# Patient Record
Sex: MALE | Race: BLACK OR AFRICAN AMERICAN | Employment: UNEMPLOYED | URBAN - METROPOLITAN AREA
[De-identification: names, ages, dates, MRNs, and addresses within clinical notes are randomized per-mention and may not be internally consistent; named-entity substitution may affect disease eponyms.]

---

## 2019-03-11 ENCOUNTER — APPOINTMENT (OUTPATIENT)
Dept: GENERAL RADIOLOGY | Age: 59
End: 2019-03-11
Attending: EMERGENCY MEDICINE
Payer: MEDICAID

## 2019-03-11 ENCOUNTER — HOSPITAL ENCOUNTER (EMERGENCY)
Age: 59
Discharge: HOME | End: 2019-03-11
Attending: EMERGENCY MEDICINE
Payer: MEDICAID

## 2019-03-11 VITALS
HEIGHT: 68 IN | SYSTOLIC BLOOD PRESSURE: 159 MMHG | TEMPERATURE: 98 F | HEART RATE: 90 BPM | OXYGEN SATURATION: 95 % | RESPIRATION RATE: 16 BRPM | WEIGHT: 162 LBS | DIASTOLIC BLOOD PRESSURE: 87 MMHG | BODY MASS INDEX: 24.55 KG/M2

## 2019-03-11 DIAGNOSIS — H10.212 CHEMICAL CONJUNCTIVITIS OF LEFT EYE: Primary | ICD-10-CM

## 2019-03-11 DIAGNOSIS — S16.1XXA STRAIN OF NECK MUSCLE, INITIAL ENCOUNTER: ICD-10-CM

## 2019-03-11 PROCEDURE — 74011000250 HC RX REV CODE- 250: Performed by: EMERGENCY MEDICINE

## 2019-03-11 PROCEDURE — 72040 X-RAY EXAM NECK SPINE 2-3 VW: CPT

## 2019-03-11 PROCEDURE — 74011250637 HC RX REV CODE- 250/637: Performed by: EMERGENCY MEDICINE

## 2019-03-11 PROCEDURE — 99285 EMERGENCY DEPT VISIT HI MDM: CPT

## 2019-03-11 RX ORDER — ERYTHROMYCIN 5 MG/G
OINTMENT OPHTHALMIC
Status: COMPLETED | OUTPATIENT
Start: 2019-03-11 | End: 2019-03-11

## 2019-03-11 RX ORDER — PROPARACAINE HYDROCHLORIDE 5 MG/ML
1 SOLUTION/ DROPS OPHTHALMIC
Status: COMPLETED | OUTPATIENT
Start: 2019-03-11 | End: 2019-03-11

## 2019-03-11 RX ORDER — ERYTHROMYCIN 5 MG/G
OINTMENT OPHTHALMIC
Qty: 3.5 G | Refills: 0 | Status: SHIPPED | OUTPATIENT
Start: 2019-03-11 | End: 2022-08-01 | Stop reason: ALTCHOICE

## 2019-03-11 RX ORDER — ACETAMINOPHEN 500 MG
1000 TABLET ORAL
Status: COMPLETED | OUTPATIENT
Start: 2019-03-11 | End: 2019-03-11

## 2019-03-11 RX ADMIN — FLUORESCEIN SODIUM 1 STRIP: 1 STRIP OPHTHALMIC at 06:06

## 2019-03-11 RX ADMIN — ERYTHROMYCIN: 5 OINTMENT OPHTHALMIC at 06:32

## 2019-03-11 RX ADMIN — ACETAMINOPHEN 1000 MG: 500 TABLET, FILM COATED ORAL at 06:32

## 2019-03-11 RX ADMIN — PROPARACAINE HYDROCHLORIDE 1 DROP: 5 SOLUTION/ DROPS OPHTHALMIC at 06:06

## 2019-03-11 NOTE — ED TRIAGE NOTES
Patient stating that he cannot open his eyes but then will spontaneously open his eyes with no difficulty.
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
## 2019-03-11 NOTE — DISCHARGE INSTRUCTIONS
Patient Education        Neck Strain: Care Instructions  Your Care Instructions    You have strained the muscles and ligaments in your neck. A sudden, awkward movement can strain the neck. This often occurs with falls or car accidents or during certain sports. Everyday activities like working on a computer or sleeping can also cause neck strain if they force you to hold your neck in an awkward position for a long time. It is common for neck pain to get worse for a day or two after an injury, but it should start to feel better after that. You may have more pain and stiffness for several days before it gets better. This is expected. It may take a few weeks or longer for it to heal completely. Good home treatment can help you get better faster and avoid future neck problems. Follow-up care is a key part of your treatment and safety. Be sure to make and go to all appointments, and call your doctor if you are having problems. It's also a good idea to know your test results and keep a list of the medicines you take. How can you care for yourself at home? · If you were given a neck brace (cervical collar) to limit neck motion, wear it as instructed for as many days as your doctor tells you to. Do not wear it longer than you were told to. Wearing a brace for too long can make neck stiffness worse and weaken the neck muscles. · You can try using heat or ice to see if it helps. ? Try using a heating pad on a low or medium setting for 15 to 20 minutes every 2 to 3 hours. Try a warm shower in place of one session with the heating pad. You can also buy single-use heat wraps that last up to 8 hours. ? You can also try an ice pack for 10 to 15 minutes every 2 to 3 hours. · Take pain medicines exactly as directed. ? If the doctor gave you a prescription medicine for pain, take it as prescribed. ? If you are not taking a prescription pain medicine, ask your doctor if you can take an over-the-counter medicine.   · Gently rub the area to relieve pain and help with blood flow. Do not massage the area if it hurts to do so. · Do not do anything that makes the pain worse. Take it easy for a couple of days. You can do your usual activities if they do not hurt your neck or put it at risk for more stress or injury. · Try sleeping on a special neck pillow. Place it under your neck, not under your head. Placing a tightly rolled-up towel under your neck while you sleep will also work. If you use a neck pillow or rolled towel, do not use your regular pillow at the same time. · To prevent future neck pain, do exercises to stretch and strengthen your neck and back. Learn how to use good posture, safe lifting techniques, and proper body mechanics. When should you call for help? Call 911 anytime you think you may need emergency care. For example, call if:    · You are unable to move an arm or a leg at all.   [facility] your doctor now or seek immediate medical care if:    · You have new or worse symptoms in your arms, legs, chest, belly, or buttocks. Symptoms may include:  ? Numbness or tingling. ? Weakness. ? Pain.     · You lose bladder or bowel control.    Watch closely for changes in your health, and be sure to contact your doctor if:    · You are not getting better as expected. Where can you learn more? Go to http://genesis-cecilia.info/. Enter M253 in the search box to learn more about \"Neck Strain: Care Instructions. \"  Current as of: September 20, 2018  Content Version: 11.9  © 3161-9794 [employer]wise, Incorporated. Care instructions adapted under license by [employer] (which disclaims liability or warranty for this information). If you have questions about a medical condition or this instruction, always ask your healthcare professional. [address] any warranty or liability for your use of this information.

## 2022-08-01 ENCOUNTER — HOSPITAL ENCOUNTER (OUTPATIENT)
Dept: LAB | Age: 62
Discharge: HOME | End: 2022-08-01
Payer: MEDICAID

## 2022-08-01 ENCOUNTER — OFFICE VISIT (OUTPATIENT)
Dept: INTERNAL MEDICINE CLINIC | Age: 62
End: 2022-08-01
Payer: MEDICAID

## 2022-08-01 VITALS
HEART RATE: 79 BPM | BODY MASS INDEX: 23.04 KG/M2 | OXYGEN SATURATION: 96 % | DIASTOLIC BLOOD PRESSURE: 80 MMHG | TEMPERATURE: 97.4 F | SYSTOLIC BLOOD PRESSURE: 129 MMHG | WEIGHT: 152 LBS | HEIGHT: 68 IN | RESPIRATION RATE: 20 BRPM

## 2022-08-01 DIAGNOSIS — Z12.11 COLON CANCER SCREENING: ICD-10-CM

## 2022-08-01 DIAGNOSIS — M54.2 NECK PAIN, CHRONIC: ICD-10-CM

## 2022-08-01 DIAGNOSIS — Z13.6 SCREENING, ISCHEMIC HEART DISEASE: ICD-10-CM

## 2022-08-01 DIAGNOSIS — M54.12 CERVICAL RADICULOPATHY: ICD-10-CM

## 2022-08-01 DIAGNOSIS — Z13.1 SCREENING FOR DIABETES MELLITUS: ICD-10-CM

## 2022-08-01 DIAGNOSIS — Z11.59 ENCOUNTER FOR HEPATITIS C SCREENING TEST FOR LOW RISK PATIENT: ICD-10-CM

## 2022-08-01 DIAGNOSIS — Z13.0 SCREENING, ANEMIA, DEFICIENCY, IRON: ICD-10-CM

## 2022-08-01 DIAGNOSIS — F17.200 TOBACCO USE DISORDER: ICD-10-CM

## 2022-08-01 DIAGNOSIS — S62.92XA CLOSED FRACTURE OF LEFT HAND, INITIAL ENCOUNTER: Primary | ICD-10-CM

## 2022-08-01 DIAGNOSIS — Z76.89 ESTABLISHING CARE WITH NEW DOCTOR, ENCOUNTER FOR: ICD-10-CM

## 2022-08-01 DIAGNOSIS — G89.29 NECK PAIN, CHRONIC: ICD-10-CM

## 2022-08-01 DIAGNOSIS — Z71.6 ENCOUNTER FOR COUNSELING FOR TOBACCO USE DISORDER: ICD-10-CM

## 2022-08-01 DIAGNOSIS — Z13.31 DEPRESSION SCREENING NEGATIVE: ICD-10-CM

## 2022-08-01 LAB
ALBUMIN SERPL-MCNC: 3.9 G/DL (ref 3.4–5)
ALBUMIN/GLOB SERPL: 1.1 {RATIO} (ref 0.8–1.7)
ALP SERPL-CCNC: 105 U/L (ref 45–117)
ALT SERPL-CCNC: 29 U/L (ref 16–61)
ANION GAP SERPL CALC-SCNC: 6 MMOL/L (ref 3–18)
AST SERPL-CCNC: 29 U/L (ref 10–38)
BASOPHILS # BLD: 0 K/UL (ref 0–0.1)
BASOPHILS NFR BLD: 1 % (ref 0–2)
BILIRUB SERPL-MCNC: 0.6 MG/DL (ref 0.2–1)
BUN SERPL-MCNC: 14 MG/DL (ref 7–18)
BUN/CREAT SERPL: 14 (ref 12–20)
CALCIUM SERPL-MCNC: 9.4 MG/DL (ref 8.5–10.1)
CHLORIDE SERPL-SCNC: 106 MMOL/L (ref 100–111)
CHOLEST SERPL-MCNC: 144 MG/DL
CO2 SERPL-SCNC: 28 MMOL/L (ref 21–32)
CREAT SERPL-MCNC: 1.03 MG/DL (ref 0.6–1.3)
DIFFERENTIAL METHOD BLD: ABNORMAL
EOSINOPHIL # BLD: 0 K/UL (ref 0–0.4)
EOSINOPHIL NFR BLD: 1 % (ref 0–5)
ERYTHROCYTE [DISTWIDTH] IN BLOOD BY AUTOMATED COUNT: 15 % (ref 11.6–14.5)
EST. AVERAGE GLUCOSE BLD GHB EST-MCNC: 111 MG/DL
GLOBULIN SER CALC-MCNC: 3.4 G/DL (ref 2–4)
GLUCOSE SERPL-MCNC: 83 MG/DL (ref 74–99)
HBA1C MFR BLD: 5.5 % (ref 4.2–5.6)
HCT VFR BLD AUTO: 43.1 % (ref 36–48)
HDLC SERPL-MCNC: 43 MG/DL (ref 40–60)
HDLC SERPL: 3.3 {RATIO} (ref 0–5)
HGB BLD-MCNC: 13.9 G/DL (ref 13–16)
IMM GRANULOCYTES # BLD AUTO: 0 K/UL (ref 0–0.04)
IMM GRANULOCYTES NFR BLD AUTO: 0 % (ref 0–0.5)
LDLC SERPL CALC-MCNC: 85 MG/DL (ref 0–100)
LIPID PROFILE,FLP: NORMAL
LYMPHOCYTES # BLD: 2 K/UL (ref 0.9–3.6)
LYMPHOCYTES NFR BLD: 36 % (ref 21–52)
MCH RBC QN AUTO: 28.3 PG (ref 24–34)
MCHC RBC AUTO-ENTMCNC: 32.3 G/DL (ref 31–37)
MCV RBC AUTO: 87.8 FL (ref 78–100)
MONOCYTES # BLD: 0.5 K/UL (ref 0.05–1.2)
MONOCYTES NFR BLD: 9 % (ref 3–10)
NEUTS SEG # BLD: 2.9 K/UL (ref 1.8–8)
NEUTS SEG NFR BLD: 53 % (ref 40–73)
NRBC # BLD: 0 K/UL (ref 0–0.01)
NRBC BLD-RTO: 0 PER 100 WBC
PLATELET # BLD AUTO: 159 K/UL (ref 135–420)
PMV BLD AUTO: 11.9 FL (ref 9.2–11.8)
POTASSIUM SERPL-SCNC: 4.5 MMOL/L (ref 3.5–5.5)
PROT SERPL-MCNC: 7.3 G/DL (ref 6.4–8.2)
RBC # BLD AUTO: 4.91 M/UL (ref 4.35–5.65)
SODIUM SERPL-SCNC: 140 MMOL/L (ref 136–145)
TRIGL SERPL-MCNC: 80 MG/DL (ref ?–150)
VLDLC SERPL CALC-MCNC: 16 MG/DL
WBC # BLD AUTO: 5.5 K/UL (ref 4.6–13.2)

## 2022-08-01 PROCEDURE — 85025 COMPLETE CBC W/AUTO DIFF WBC: CPT

## 2022-08-01 PROCEDURE — 83036 HEMOGLOBIN GLYCOSYLATED A1C: CPT

## 2022-08-01 PROCEDURE — 99204 OFFICE O/P NEW MOD 45 MIN: CPT | Performed by: STUDENT IN AN ORGANIZED HEALTH CARE EDUCATION/TRAINING PROGRAM

## 2022-08-01 PROCEDURE — 80061 LIPID PANEL: CPT

## 2022-08-01 PROCEDURE — 96127 BRIEF EMOTIONAL/BEHAV ASSMT: CPT | Performed by: STUDENT IN AN ORGANIZED HEALTH CARE EDUCATION/TRAINING PROGRAM

## 2022-08-01 PROCEDURE — 86803 HEPATITIS C AB TEST: CPT

## 2022-08-01 PROCEDURE — 36415 COLL VENOUS BLD VENIPUNCTURE: CPT

## 2022-08-01 PROCEDURE — 99406 BEHAV CHNG SMOKING 3-10 MIN: CPT | Performed by: STUDENT IN AN ORGANIZED HEALTH CARE EDUCATION/TRAINING PROGRAM

## 2022-08-01 PROCEDURE — 80053 COMPREHEN METABOLIC PANEL: CPT

## 2022-08-01 RX ORDER — MELOXICAM 15 MG/1
15 TABLET ORAL DAILY
Qty: 30 TABLET | Refills: 1 | Status: SHIPPED | OUTPATIENT
Start: 2022-08-01

## 2022-08-01 NOTE — PROGRESS NOTES
Melany Plascencia is a 57 y.o. male (: 1960) presenting to address:    Chief Complaint   Patient presents with    New Patient    Establish Care    Hand Pain     Patient c/o LT hand pain          pain scale  5/10       Vitals:    22 1054   BP: 129/80   Pulse: 79   Resp: 20   Temp: 97.4 °F (36.3 °C)   TempSrc: Temporal   SpO2: 96%   Weight: 152 lb (68.9 kg)   Height: 5' 8\" (1.727 m)   PainSc:   5   PainLoc: Hand       Hearing/Vision:   No results found. Learning Assessment:   No flowsheet data found. Depression Screening:     3 most recent PHQ Screens 2022   Little interest or pleasure in doing things Not at all   Feeling down, depressed, irritable, or hopeless Not at all   Total Score PHQ 2 0     Fall Risk Assessment:   No flowsheet data found. Abuse Screening:   No flowsheet data found. Coordination of Care Questionaire:     Advanced Directive:   1. Do you have an Advanced Directive? NO    2. Would you like information on Advanced Directives? NO    1. \"Have you been to the ER, urgent care clinic since your last visit? Hospitalized since your last visit? \" No    2. \"Have you seen or consulted any other health care providers outside of the [address] [place] [address] since your last visit? \" No     3. For patients aged 39-70: Has the patient had a colonoscopy? No     If the patient is female:    4. For patients aged 41-77: Has the patient had a mammogram within the past 2 years? No    5. For patients aged 21-65: Has the patient had a pap smear?  No

## 2022-08-01 NOTE — LETTER
NOTIFICATION RETURN TO WORK / SCHOOL    8/1/2022 11:25 AM    Mr. Melissa Sevilla  [address] [facility] [place] [address]      To Whom It May Concern: Melissa Sevilla is currently under the care of Kusum Pringle. He will return to work/school on: 08/02/2022    If there are questions or concerns please have the patient contact our office.         Sincerely,      Royal Serum, DO
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
## 2022-08-02 LAB
HCV AB SER IA-ACNC: 0.05 INDEX
HCV AB SERPL QL IA: NEGATIVE
HCV COMMENT,HCGAC: NORMAL

## 2022-10-04 ENCOUNTER — TELEPHONE (OUTPATIENT)
Dept: INTERNAL MEDICINE CLINIC | Age: 62
End: 2022-10-04

## 2022-10-04 NOTE — TELEPHONE ENCOUNTER
Received a call from the [address] [place] [address],[address], pt flagged red for body aches, chills, night time sweats, productive green cough with SOB and change in appetite. S/w the pt and informed these symptoms started Friday. Pt states he has not taken the influenza, PCV and COVID-19 vaccines and continues to smoke cigarettes daily. Pt states taking Tylenol, Nighttime mucinex without relief. Pt is encouraged to go to the ED for further evaluation. Pt is agreeable. Will notify PCP.

## 2022-11-11 ENCOUNTER — TELEPHONE (OUTPATIENT)
Dept: INTERNAL MEDICINE CLINIC | Age: 62
End: 2022-11-11

## 2022-11-17 ENCOUNTER — OFFICE VISIT (OUTPATIENT)
Dept: INTERNAL MEDICINE CLINIC | Age: 62
End: 2022-11-17
Payer: MEDICAID

## 2022-11-17 VITALS
BODY MASS INDEX: 22.43 KG/M2 | TEMPERATURE: 96.8 F | RESPIRATION RATE: 18 BRPM | SYSTOLIC BLOOD PRESSURE: 161 MMHG | OXYGEN SATURATION: 100 % | HEART RATE: 86 BPM | HEIGHT: 68 IN | DIASTOLIC BLOOD PRESSURE: 98 MMHG | WEIGHT: 148 LBS

## 2022-11-17 DIAGNOSIS — S12.390S: ICD-10-CM

## 2022-11-17 DIAGNOSIS — M54.2 NECK PAIN, CHRONIC: ICD-10-CM

## 2022-11-17 DIAGNOSIS — M47.22 OSTEOARTHRITIS OF SPINE WITH RADICULOPATHY, CERVICAL REGION: ICD-10-CM

## 2022-11-17 DIAGNOSIS — M54.12 CERVICAL RADICULOPATHY: Primary | ICD-10-CM

## 2022-11-17 DIAGNOSIS — G89.29 NECK PAIN, CHRONIC: ICD-10-CM

## 2022-11-17 PROCEDURE — 99214 OFFICE O/P EST MOD 30 MIN: CPT | Performed by: INTERNAL MEDICINE

## 2022-11-17 RX ORDER — NAPROXEN 500 MG/1
500 TABLET ORAL 2 TIMES DAILY
COMMUNITY
Start: 2022-11-13

## 2022-11-17 RX ORDER — CYCLOBENZAPRINE HCL 10 MG
TABLET ORAL
COMMUNITY
Start: 2022-11-13

## 2022-11-17 NOTE — LETTER
NOTIFICATION RETURN TO WORK / SCHOOL    11/17/2022 9:01 AM    Mr. Jeaneth Moy  [address] [facility] [place] [address]      To Whom It May Concern: Jeaneth Moy is currently under the care of Kusum Pringle. He will return to work/school on after seeing a specialist. He has been out of work since 11/10/2022    If there are questions or concerns please have the patient contact our office.         Sincerely,      Harsha Wright MD

## 2022-11-17 NOTE — PROGRESS NOTES
Progress Note    Patient: Jana Mackey               Sex: male                  YOB: 1960      Age:  60 y.o.                    HPI:     Jana Mackey is a 60 y.o. male who has been seen for neck pain. He was seen in [facility] ER . He has sharp pains going to the back of his head. He has associated neck stiffness. He has an old cervical compression fracture . 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 No pertinent surgical history. Family History   Problem Relation Age of Onset    Stroke Mother        Social History     Socioeconomic History    Marital status: SINGLE   Tobacco Use    Smoking status: Every Day    Smokeless tobacco: Never    Tobacco comments:     Black and mild   Substance and Sexual Activity    Alcohol use: Yes    Drug use: Yes     Types: Marijuana         Current Outpatient Medications:     cyclobenzaprine (FLEXERIL) 10 mg tablet, TAKE 1 TABLET BY MOUTH EVERY 8 HOURS AS NEEDED, Disp: , Rfl:     naproxen (NAPROSYN) 500 mg tablet, Take 500 mg by mouth two (2) times a day., Disp: , Rfl:     meloxicam (MOBIC) 15 mg tablet, Take 1 Tablet by mouth in the morning. (Patient not taking: Reported on 11/17/2022), Disp: 30 Tablet, Rfl: 1     Allergies   Allergen Reactions    Eggs [Egg] Nausea Only       Review of Systems   Constitutional:  Negative for chills and fever. Respiratory:  Negative for cough. Cardiovascular:  Negative for chest pain. Gastrointestinal: Negative. Genitourinary: Negative. Neurological:  Positive for dizziness.       Physical Exam:      Visit Vitals  BP (!) 161/98 (BP 1 Location: Right lower arm, BP Patient Position: Sitting)   Pulse 86   Temp 96.8 °F (36 °C) (Temporal)   Resp 18   Ht 5' 8\" (1.727 m)   Wt 148 lb (67.1 kg)   SpO2 100%   BMI 22.50 kg/m²       Physical Exam  Constitutional:       Appearance: Normal appearance. Cardiovascular:      Rate and Rhythm: Normal rate and regular rhythm. Pulses: Normal pulses. Heart sounds: Normal heart sounds. Pulmonary:      Effort: Pulmonary effort is normal. No respiratory distress. Breath sounds: Normal breath sounds. No stridor. No wheezing or rhonchi. Musculoskeletal:         General: Tenderness present. Comments: Severe LOM of his neck . He has  tenderness about  his L shoulder and posterior  L neck . Skin:     General: Skin is warm and dry. Neurological:      Mental Status: He is alert and oriented to person, place, and time. Labs Reviewed:      Assessment/Plan       ICD-10-CM ICD-9-CM    1. Cervical radiculopathy  M54.12 723.4 REFERRAL TO NEUROSURGERY      2. Neck pain, chronic  M54.2 723.1 REFERRAL TO NEUROSURGERY    G89.29 338.29       He is unable to work now . He will be given a note to that effect .  Continue current meds          Tanvi Mendiola MD

## 2022-11-17 NOTE — PROGRESS NOTES
ROOM # 15  Identified pt with two pt identifiers(name and ). Reviewed record in preparation for visit and have obtained necessary documentation. Chief Complaint   Patient presents with    Back Pain     Seen at [facility] due to pain       Ambika Weems preferred language for health care discussion is english/other. Is the patient using any DME equipment during OV? Jamison Robbins is due for:  Health Maintenance Due   Topic    COVID-19 Vaccine (1)    Pneumococcal 0-64 years (1 - PCV)    Colorectal Cancer Screening Combo     Shingrix Vaccine Age 50> (1 of 2)    Flu Vaccine (1)     Health Maintenance reviewed and discussed per provider  Please order/place referral if appropriate. 1. For patients aged 39-70: Has the patient had a colonoscopy? No   If the patient is female:    2. For patients aged 41-77: Has the patient had a mammogram within the past 2 years? No    3. For patients aged 21-65: Has the patient had a pap smear? No  Advance Directive:  1. Do you have an advance directive in place? Patient Reply: NO    2. If not, would you like material regarding how to put one in place? NO    Coordination of Care:  1. Have you been to the ER, urgent care clinic since your last visit? Hospitalized since your last visit? YES    2. Have you seen or consulted any other health care providers outside of the [address] [place] [address] [place] since your last visit? Include any pap smears or colon screening. YES    Patient is accompanied by self I have received verbal consent from Ambika Weems to discuss any/all medical information while they are present in the room.       Depression Screening:  3 most recent PHQ Screens 2022   Little interest or pleasure in doing things Not at all   Feeling down, depressed, irritable, or hopeless Not at all   Total Score PHQ 2 0       Recent Travel Screening and Travel History documentation     Travel Screening     No screening recorded since 22 0000       Travel History Travel since 10/17/22    No documented travel since 10/17/22

## 2022-11-17 NOTE — LETTER
11/17/2022 9:46 AM    Mr. Jessy Yu  [address] [place] [address]              Sincerely,      Ellen Romero MD

## 2022-12-08 ENCOUNTER — TELEPHONE (OUTPATIENT)
Dept: INTERNAL MEDICINE CLINIC | Age: 62
End: 2022-12-08

## 2022-12-14 NOTE — TELEPHONE ENCOUNTER
Pt called stating that his job wants to know when he can go back to work. I did let the pt know that the neurologist would have to let him know that when he sees him but he said his job needs to know now. Pt would like to know are there any options for him because he fears getting kicked out of his house. Pt would like to have messages through [employer], I gave him the info to set that up for today.

## 2022-12-14 NOTE — TELEPHONE ENCOUNTER
Spoke with the pt and advised him that DR. Mendy Martniez gave him the out of work note until he see the neurologist. Pt will need to forward that note to the unemployment office. Pt understood. We cant issue return to work note . The neurologist  will need to issue the RTW note. Pt verbally understood.

## 2023-03-20 PROBLEM — G95.9 CERVICAL MYELOPATHY (HCC): Status: ACTIVE | Noted: 2023-03-20

## 2023-03-21 ENCOUNTER — TELEPHONE (OUTPATIENT)
Facility: CLINIC | Age: 63
End: 2023-03-21

## 2023-04-10 ENCOUNTER — TELEPHONE (OUTPATIENT)
Facility: CLINIC | Age: 63
End: 2023-04-10

## 2023-06-29 ENCOUNTER — OFFICE VISIT (OUTPATIENT)
Facility: CLINIC | Age: 63
End: 2023-06-29
Payer: COMMERCIAL

## 2023-06-29 VITALS
BODY MASS INDEX: 22.66 KG/M2 | HEIGHT: 69 IN | RESPIRATION RATE: 18 BRPM | SYSTOLIC BLOOD PRESSURE: 138 MMHG | OXYGEN SATURATION: 98 % | DIASTOLIC BLOOD PRESSURE: 84 MMHG | WEIGHT: 153 LBS | HEART RATE: 81 BPM

## 2023-06-29 DIAGNOSIS — G89.29 OTHER CHRONIC PAIN: ICD-10-CM

## 2023-06-29 DIAGNOSIS — M25.561 CHRONIC PAIN OF RIGHT KNEE: ICD-10-CM

## 2023-06-29 DIAGNOSIS — M54.12 RADICULOPATHY, CERVICAL REGION: Primary | ICD-10-CM

## 2023-06-29 DIAGNOSIS — G89.29 CHRONIC PAIN OF RIGHT KNEE: ICD-10-CM

## 2023-06-29 DIAGNOSIS — Z71.6 TOBACCO ABUSE COUNSELING: ICD-10-CM

## 2023-06-29 DIAGNOSIS — M54.41 CHRONIC RIGHT-SIDED LOW BACK PAIN WITH RIGHT-SIDED SCIATICA: ICD-10-CM

## 2023-06-29 DIAGNOSIS — F32.A DEPRESSION, UNSPECIFIED DEPRESSION TYPE: ICD-10-CM

## 2023-06-29 DIAGNOSIS — M54.2 CERVICALGIA: ICD-10-CM

## 2023-06-29 DIAGNOSIS — G89.29 CHRONIC RIGHT-SIDED LOW BACK PAIN WITH RIGHT-SIDED SCIATICA: ICD-10-CM

## 2023-06-29 DIAGNOSIS — F17.200 TOBACCO USE DISORDER: ICD-10-CM

## 2023-06-29 DIAGNOSIS — Z13.31 POSITIVE DEPRESSION SCREENING: ICD-10-CM

## 2023-06-29 PROCEDURE — 99214 OFFICE O/P EST MOD 30 MIN: CPT | Performed by: STUDENT IN AN ORGANIZED HEALTH CARE EDUCATION/TRAINING PROGRAM

## 2023-06-29 PROCEDURE — 99406 BEHAV CHNG SMOKING 3-10 MIN: CPT | Performed by: STUDENT IN AN ORGANIZED HEALTH CARE EDUCATION/TRAINING PROGRAM

## 2023-06-29 RX ORDER — HYDROCODONE BITARTRATE AND ACETAMINOPHEN 7.5; 325 MG/1; MG/1
1 TABLET ORAL EVERY 6 HOURS PRN
COMMUNITY
Start: 2023-06-26

## 2023-06-29 RX ORDER — DULOXETIN HYDROCHLORIDE 30 MG/1
30 CAPSULE, DELAYED RELEASE ORAL DAILY
Qty: 30 CAPSULE | Refills: 3 | Status: SHIPPED | OUTPATIENT
Start: 2023-06-29 | End: 2023-06-29 | Stop reason: SDUPTHER

## 2023-06-29 RX ORDER — DULOXETIN HYDROCHLORIDE 30 MG/1
30 CAPSULE, DELAYED RELEASE ORAL DAILY
Qty: 30 CAPSULE | Refills: 3 | Status: SHIPPED | OUTPATIENT
Start: 2023-06-29

## 2023-06-29 RX ORDER — METHOCARBAMOL 750 MG/1
TABLET, FILM COATED ORAL
COMMUNITY

## 2023-06-29 ASSESSMENT — PATIENT HEALTH QUESTIONNAIRE - PHQ9
9. THOUGHTS THAT YOU WOULD BE BETTER OFF DEAD, OR OF HURTING YOURSELF: 3
2. FEELING DOWN, DEPRESSED OR HOPELESS: 3
SUM OF ALL RESPONSES TO PHQ QUESTIONS 1-9: 18
SUM OF ALL RESPONSES TO PHQ QUESTIONS 1-9: 21
5. POOR APPETITE OR OVEREATING: 3
10. IF YOU CHECKED OFF ANY PROBLEMS, HOW DIFFICULT HAVE THESE PROBLEMS MADE IT FOR YOU TO DO YOUR WORK, TAKE CARE OF THINGS AT HOME, OR GET ALONG WITH OTHER PEOPLE: 3
8. MOVING OR SPEAKING SO SLOWLY THAT OTHER PEOPLE COULD HAVE NOTICED. OR THE OPPOSITE, BEING SO FIGETY OR RESTLESS THAT YOU HAVE BEEN MOVING AROUND A LOT MORE THAN USUAL: 1
4. FEELING TIRED OR HAVING LITTLE ENERGY: 3
7. TROUBLE CONCENTRATING ON THINGS, SUCH AS READING THE NEWSPAPER OR WATCHING TELEVISION: 3
SUM OF ALL RESPONSES TO PHQ QUESTIONS 1-9: 21
6. FEELING BAD ABOUT YOURSELF - OR THAT YOU ARE A FAILURE OR HAVE LET YOURSELF OR YOUR FAMILY DOWN: 0
SUM OF ALL RESPONSES TO PHQ9 QUESTIONS 1 & 2: 5
1. LITTLE INTEREST OR PLEASURE IN DOING THINGS: 2
3. TROUBLE FALLING OR STAYING ASLEEP: 3
SUM OF ALL RESPONSES TO PHQ QUESTIONS 1-9: 21

## 2023-06-29 ASSESSMENT — ENCOUNTER SYMPTOMS
BACK PAIN: 1
ABDOMINAL PAIN: 0
SHORTNESS OF BREATH: 0

## 2023-06-29 ASSESSMENT — COLUMBIA-SUICIDE SEVERITY RATING SCALE - C-SSRS
1. WITHIN THE PAST MONTH, HAVE YOU WISHED YOU WERE DEAD OR WISHED YOU COULD GO TO SLEEP AND NOT WAKE UP?: YES
6. HAVE YOU EVER DONE ANYTHING, STARTED TO DO ANYTHING, OR PREPARED TO DO ANYTHING TO END YOUR LIFE?: NO
2. HAVE YOU ACTUALLY HAD ANY THOUGHTS OF KILLING YOURSELF?: NO

## 2023-08-29 ENCOUNTER — OFFICE VISIT (OUTPATIENT)
Facility: CLINIC | Age: 63
End: 2023-08-29
Payer: MEDICAID

## 2023-08-29 VITALS
RESPIRATION RATE: 20 BRPM | OXYGEN SATURATION: 98 % | BODY MASS INDEX: 23.08 KG/M2 | SYSTOLIC BLOOD PRESSURE: 115 MMHG | HEART RATE: 83 BPM | HEIGHT: 69 IN | TEMPERATURE: 97 F | DIASTOLIC BLOOD PRESSURE: 76 MMHG | WEIGHT: 155.8 LBS

## 2023-08-29 DIAGNOSIS — G89.29 CHRONIC INTRACTABLE HEADACHE, UNSPECIFIED HEADACHE TYPE: ICD-10-CM

## 2023-08-29 DIAGNOSIS — R51.9 CHRONIC INTRACTABLE HEADACHE, UNSPECIFIED HEADACHE TYPE: ICD-10-CM

## 2023-08-29 DIAGNOSIS — M54.41 CHRONIC RIGHT-SIDED LOW BACK PAIN WITH RIGHT-SIDED SCIATICA: Primary | ICD-10-CM

## 2023-08-29 DIAGNOSIS — Z12.11 COLON CANCER SCREENING: ICD-10-CM

## 2023-08-29 DIAGNOSIS — G89.29 CHRONIC RIGHT-SIDED LOW BACK PAIN WITH RIGHT-SIDED SCIATICA: Primary | ICD-10-CM

## 2023-08-29 DIAGNOSIS — Z71.6 ENCOUNTER FOR COUNSELING FOR TOBACCO USE DISORDER: ICD-10-CM

## 2023-08-29 DIAGNOSIS — F17.200 TOBACCO USE DISORDER: ICD-10-CM

## 2023-08-29 PROCEDURE — 99406 BEHAV CHNG SMOKING 3-10 MIN: CPT | Performed by: STUDENT IN AN ORGANIZED HEALTH CARE EDUCATION/TRAINING PROGRAM

## 2023-08-29 PROCEDURE — 99213 OFFICE O/P EST LOW 20 MIN: CPT | Performed by: STUDENT IN AN ORGANIZED HEALTH CARE EDUCATION/TRAINING PROGRAM

## 2023-08-29 SDOH — ECONOMIC STABILITY: HOUSING INSECURITY
IN THE LAST 12 MONTHS, WAS THERE A TIME WHEN YOU DID NOT HAVE A STEADY PLACE TO SLEEP OR SLEPT IN A SHELTER (INCLUDING NOW)?: NO

## 2023-08-29 SDOH — ECONOMIC STABILITY: FOOD INSECURITY: WITHIN THE PAST 12 MONTHS, YOU WORRIED THAT YOUR FOOD WOULD RUN OUT BEFORE YOU GOT MONEY TO BUY MORE.: SOMETIMES TRUE

## 2023-08-29 SDOH — ECONOMIC STABILITY: FOOD INSECURITY: WITHIN THE PAST 12 MONTHS, THE FOOD YOU BOUGHT JUST DIDN'T LAST AND YOU DIDN'T HAVE MONEY TO GET MORE.: NEVER TRUE

## 2023-08-29 SDOH — ECONOMIC STABILITY: INCOME INSECURITY: HOW HARD IS IT FOR YOU TO PAY FOR THE VERY BASICS LIKE FOOD, HOUSING, MEDICAL CARE, AND HEATING?: SOMEWHAT HARD

## 2023-08-29 ASSESSMENT — PATIENT HEALTH QUESTIONNAIRE - PHQ9
3. TROUBLE FALLING OR STAYING ASLEEP: 0
SUM OF ALL RESPONSES TO PHQ QUESTIONS 1-9: 0
9. THOUGHTS THAT YOU WOULD BE BETTER OFF DEAD, OR OF HURTING YOURSELF: 0
10. IF YOU CHECKED OFF ANY PROBLEMS, HOW DIFFICULT HAVE THESE PROBLEMS MADE IT FOR YOU TO DO YOUR WORK, TAKE CARE OF THINGS AT HOME, OR GET ALONG WITH OTHER PEOPLE: 0
2. FEELING DOWN, DEPRESSED OR HOPELESS: 0
SUM OF ALL RESPONSES TO PHQ QUESTIONS 1-9: 0
SUM OF ALL RESPONSES TO PHQ QUESTIONS 1-9: 0
6. FEELING BAD ABOUT YOURSELF - OR THAT YOU ARE A FAILURE OR HAVE LET YOURSELF OR YOUR FAMILY DOWN: 0
5. POOR APPETITE OR OVEREATING: 0
SUM OF ALL RESPONSES TO PHQ9 QUESTIONS 1 & 2: 0
SUM OF ALL RESPONSES TO PHQ QUESTIONS 1-9: 0
SUM OF ALL RESPONSES TO PHQ9 QUESTIONS 1 & 2: 0
SUM OF ALL RESPONSES TO PHQ QUESTIONS 1-9: 0
8. MOVING OR SPEAKING SO SLOWLY THAT OTHER PEOPLE COULD HAVE NOTICED. OR THE OPPOSITE, BEING SO FIGETY OR RESTLESS THAT YOU HAVE BEEN MOVING AROUND A LOT MORE THAN USUAL: 0
4. FEELING TIRED OR HAVING LITTLE ENERGY: 0
7. TROUBLE CONCENTRATING ON THINGS, SUCH AS READING THE NEWSPAPER OR WATCHING TELEVISION: 0
2. FEELING DOWN, DEPRESSED OR HOPELESS: 0
1. LITTLE INTEREST OR PLEASURE IN DOING THINGS: 0
1. LITTLE INTEREST OR PLEASURE IN DOING THINGS: 0

## 2023-08-29 ASSESSMENT — ENCOUNTER SYMPTOMS
SHORTNESS OF BREATH: 0
ABDOMINAL PAIN: 0

## 2023-08-29 NOTE — PROGRESS NOTES
1. \"Have you been to the ER, urgent care clinic since your last visit? Hospitalized since your last visit? \" No    2. \"Have you seen or consulted any other health care providers outside of the [address] [place] [address] since your last visit? \" Yes    3. For patients aged 43-73: Has the patient had a colonoscopy / FIT/ Cologuard? No      If the patient is female:    4. For patients aged 43-66: Has the patient had a mammogram within the past 2 years? NA - based on age or sex      11. For patients aged 21-65: Has the patient had a pap smear?  NA - based on age or sex

## 2023-09-12 PROBLEM — J18.9 COMMUNITY ACQUIRED PNEUMONIA: Status: ACTIVE | Noted: 2018-03-03

## 2023-09-12 PROBLEM — R11.2 NON-INTRACTABLE VOMITING WITH NAUSEA: Status: ACTIVE | Noted: 2023-09-12

## 2023-09-12 PROBLEM — S02.30XA ORBITAL FLOOR FRACTURE (HCC): Status: ACTIVE | Noted: 2017-02-18

## 2023-09-12 PROBLEM — M54.2 CHRONIC NECK PAIN: Status: ACTIVE | Noted: 2023-07-14

## 2023-09-12 PROBLEM — R53.1 RIGHT SIDED WEAKNESS: Status: ACTIVE | Noted: 2017-09-03

## 2023-09-12 PROBLEM — G45.9 TIA (TRANSIENT ISCHEMIC ATTACK): Status: ACTIVE | Noted: 2017-09-03

## 2023-09-12 PROBLEM — M25.512 ACUTE PAIN OF LEFT SHOULDER: Status: ACTIVE | Noted: 2021-03-24

## 2023-09-12 PROBLEM — K52.9 GASTROENTERITIS: Status: ACTIVE | Noted: 2017-09-04

## 2023-09-12 PROBLEM — F12.10 CANNABIS ABUSE: Status: ACTIVE | Noted: 2023-09-12

## 2023-09-12 PROBLEM — G89.29 CHRONIC NECK PAIN: Status: ACTIVE | Noted: 2023-07-14

## 2023-09-12 PROBLEM — F19.90 DRUG USE: Status: ACTIVE | Noted: 2021-03-24

## 2023-09-12 PROBLEM — S06.5XAA SUBDURAL HEMATOMA (HCC): Status: ACTIVE | Noted: 2017-02-18

## 2023-09-12 NOTE — PROGRESS NOTES
[facility]  [address] [place] [address], [address]   Phone: [phone]  Fax: [phone]    NEW PATIENT  Pt's YOB: 1960    ASSESSMENT   Mary Hua was seen today for new patient and lower back pain. Diagnoses and all orders for this visit:    Arm weakness  -     MRI CERVICAL SPINE WO CONTRAST; Future    Weakness of both lower extremities  -     MRI CERVICAL SPINE WO CONTRAST; Future    Lumbar facet arthropathy  -     methylPREDNISolone (MEDROL DOSEPACK) 4 MG tablet; Take by mouth. Gait abnormality  -     MRI CERVICAL SPINE WO CONTRAST; Future    DDD (degenerative disc disease), lumbar    Muscle spasm    Status post cervical spinal fusion  -     MRI CERVICAL SPINE WO CONTRAST; Future         IMPRESSION AND PLAN:  Malcolm Gonzalez is a 58 y.o. male with history of cervical and lumbar pain. Pt presents to the office today as a new patient referred by Michel Daly DO. Pt complains of pain in the cervical and lumbar regions with radicular symptoms into the right leg down to the foot. Pt has undergone cervical surgery by Dr. Rajesh Fleming in April with improvement. He is currently undergoing physical therapy for his C-spine operation. 1) Discussed treatment options with the patient including medications and physical therapy. 2) A Medrol Dosepak was prescribed for acute inflammatory pain. 3) Cervical spine MRI was ordered to assess for spinal stenosis, inflammation and impingement. 4) Mr. Bradley Aguila has a reminder for a \"due or due soon\" health maintenance. I have asked that he contact his primary care provider, Michel Daly DO, for follow-up on this health maintenance. 5)  demonstrated consistency with prescribing. 6) Recommend pt follow up with Dr Lizzy Loja -- pt states he cannot see him until November. Return in about 4 weeks (around 10/11/2023) for Diagnostic follow up, PT follow up.       HISTORY OF PRESENT ILLNESS:  Malcolm Gonzalez is a 58

## 2023-09-13 ENCOUNTER — OFFICE VISIT (OUTPATIENT)
Age: 63
End: 2023-09-13
Payer: MEDICAID

## 2023-09-13 VITALS
TEMPERATURE: 96.8 F | DIASTOLIC BLOOD PRESSURE: 82 MMHG | OXYGEN SATURATION: 97 % | HEIGHT: 69 IN | WEIGHT: 156 LBS | BODY MASS INDEX: 23.11 KG/M2 | HEART RATE: 66 BPM | SYSTOLIC BLOOD PRESSURE: 132 MMHG

## 2023-09-13 DIAGNOSIS — M62.838 MUSCLE SPASM: ICD-10-CM

## 2023-09-13 DIAGNOSIS — R29.898 WEAKNESS OF BOTH LOWER EXTREMITIES: ICD-10-CM

## 2023-09-13 DIAGNOSIS — M47.816 LUMBAR FACET ARTHROPATHY: ICD-10-CM

## 2023-09-13 DIAGNOSIS — R26.9 GAIT ABNORMALITY: ICD-10-CM

## 2023-09-13 DIAGNOSIS — Z98.1 STATUS POST CERVICAL SPINAL FUSION: ICD-10-CM

## 2023-09-13 DIAGNOSIS — M51.36 DDD (DEGENERATIVE DISC DISEASE), LUMBAR: ICD-10-CM

## 2023-09-13 DIAGNOSIS — R29.898 ARM WEAKNESS: Primary | ICD-10-CM

## 2023-09-13 PROCEDURE — 99203 OFFICE O/P NEW LOW 30 MIN: CPT | Performed by: PHYSICAL MEDICINE & REHABILITATION

## 2023-09-13 RX ORDER — METHYLPREDNISOLONE 4 MG/1
TABLET ORAL
Qty: 1 KIT | Refills: 0 | Status: SHIPPED | OUTPATIENT
Start: 2023-09-13 | End: 2023-09-19

## 2023-09-13 NOTE — PROGRESS NOTES
Malcolm Gonzalez presents today for   Chief Complaint   Patient presents with    New Patient    Lower Back Pain       Is someone accompanying this pt? no    Is the patient using any DME equipment during OV? Yes, walking stick        Coordination of Care:  1. Have you been to the ER, urgent care clinic since your last visit? no  Hospitalized since your last visit? no    2. Have you seen or consulted any other health care providers outside of the [address] [place] [address] Avenue since your last visit? Yes, Dr. Paulie Quiñones any pap smears or colon screening.  no

## 2024-01-26 ENCOUNTER — TELEPHONE (OUTPATIENT)
Facility: CLINIC | Age: 64
End: 2024-01-26

## 2024-01-26 NOTE — TELEPHONE ENCOUNTER
Voicemail left for patient to return our call at their earliest convenience.  Need to see if pt can come in on 1/29/24 @ 830am.    Need to do TCM workflow.  I asked pt to either ask for myself or Gertrude.    1/29 appt taken.  If TCM workflow can be completed today we can change the 2/6/24 to a TCM.

## 2024-02-12 NOTE — PROGRESS NOTES
HISTORY OF PRESENT ILLNESS  Armaan Fay is a 63 y.o. male presenting today for   Chief Complaint   Patient presents with    Follow-Up from Hospital    Stroke        Hospital fu- hospitalized between 1/16/24 and 1/24/24 for CVA.   Hospital Summary:  He was brought in after found unresponsive at home. The patient was noted to be out of the thrombolytic window.   Head CT was consistent with subacute CVA of left MCA with midline shift. .  Echo completed  Brain MRI completed  Upon d/c pt able tp speak one word answers  Home care initiated    Medication Changes:   Start ASA 325mg daily and Atorvastatin 40mg nightly    Presents today with daughter who provides much of HPI and current events. Est with [email or web address] and was referred to home PT/OT and speech neurology, received a call to est with neuro but the appointment wasn't soon enough and so they declined. Pt is currently still having difficulties with speech, swallowing and L sided weakness.            Review of Systems   Eyes:  Negative for visual disturbance.   Respiratory:  Negative for shortness of breath.    Cardiovascular:  Negative for chest pain.   Gastrointestinal:  Negative for abdominal pain.   Neurological:  Positive for speech difficulty and weakness. Negative for headaches.         /83   Pulse 66   Temp 97.2 °F (36.2 °C) (Oral)   Resp 20   Ht 1.74 m (5' 8.5\")   Wt 73.5 kg (162 lb)   SpO2 98%   BMI 24.27 kg/m²     Physical Exam  HENT:      Mouth/Throat:      Comments: MASK  Eyes:      Pupils: Pupils are equal, round, and reactive to light.   Cardiovascular:      Rate and Rhythm: Normal rate and regular rhythm.   Pulmonary:      Effort: Pulmonary effort is normal.      Breath sounds: Normal breath sounds.   Musculoskeletal:      Right lower leg: No edema.      Left lower leg: No edema.   Neurological:      Motor: Weakness present.   Psychiatric:         Mood and Affect: Mood normal.         ASSESSMENT and PLAN    ICD-10-CM    1.

## 2024-02-13 ENCOUNTER — OFFICE VISIT (OUTPATIENT)
Facility: CLINIC | Age: 64
End: 2024-02-13
Payer: MEDICAID

## 2024-02-13 VITALS
SYSTOLIC BLOOD PRESSURE: 128 MMHG | HEIGHT: 69 IN | TEMPERATURE: 97.2 F | DIASTOLIC BLOOD PRESSURE: 83 MMHG | HEART RATE: 66 BPM | BODY MASS INDEX: 23.99 KG/M2 | OXYGEN SATURATION: 98 % | RESPIRATION RATE: 20 BRPM | WEIGHT: 162 LBS

## 2024-02-13 DIAGNOSIS — I69.30 HISTORY OF CEREBROVASCULAR ACCIDENT (CVA) WITH RESIDUAL DEFICIT: ICD-10-CM

## 2024-02-13 DIAGNOSIS — R53.1 LEFT-SIDED WEAKNESS: ICD-10-CM

## 2024-02-13 DIAGNOSIS — Z13.31 POSITIVE DEPRESSION SCREENING: ICD-10-CM

## 2024-02-13 DIAGNOSIS — Z71.6 ENCOUNTER FOR COUNSELING FOR TOBACCO USE DISORDER: ICD-10-CM

## 2024-02-13 DIAGNOSIS — F33.1 MODERATE EPISODE OF RECURRENT MAJOR DEPRESSIVE DISORDER (HCC): Primary | ICD-10-CM

## 2024-02-13 DIAGNOSIS — R47.01 APHASIA: ICD-10-CM

## 2024-02-13 DIAGNOSIS — Z71.6 TOBACCO ABUSE COUNSELING: ICD-10-CM

## 2024-02-13 DIAGNOSIS — G81.94 HEMIPLEGIA AFFECTING LEFT NONDOMINANT SIDE, UNSPECIFIED ETIOLOGY, UNSPECIFIED HEMIPLEGIA TYPE (HCC): ICD-10-CM

## 2024-02-13 DIAGNOSIS — F17.200 TOBACCO USE DISORDER: ICD-10-CM

## 2024-02-13 PROCEDURE — 99214 OFFICE O/P EST MOD 30 MIN: CPT | Performed by: STUDENT IN AN ORGANIZED HEALTH CARE EDUCATION/TRAINING PROGRAM

## 2024-02-13 PROCEDURE — 96127 BRIEF EMOTIONAL/BEHAV ASSMT: CPT | Performed by: STUDENT IN AN ORGANIZED HEALTH CARE EDUCATION/TRAINING PROGRAM

## 2024-02-13 PROCEDURE — 99406 BEHAV CHNG SMOKING 3-10 MIN: CPT | Performed by: STUDENT IN AN ORGANIZED HEALTH CARE EDUCATION/TRAINING PROGRAM

## 2024-02-13 RX ORDER — ATORVASTATIN CALCIUM 40 MG/1
40 TABLET, FILM COATED ORAL NIGHTLY
COMMUNITY

## 2024-02-13 RX ORDER — ESCITALOPRAM OXALATE 5 MG/1
5 TABLET ORAL DAILY
Qty: 30 TABLET | Refills: 2 | Status: SHIPPED | OUTPATIENT
Start: 2024-02-13

## 2024-02-13 RX ORDER — ASPIRIN 325 MG
325 TABLET ORAL DAILY
COMMUNITY
Start: 2024-01-25

## 2024-02-13 NOTE — PROGRESS NOTES
\"Have you been to the ER, urgent care clinic since your last visit?  Hospitalized since your last visit?\"    YES - When: approximately 3  weeks ago.  Where and Why: [facility]    stroke.    “Have you seen or consulted any other health care providers outside of [facility] since your last visit?”    NO    “Have you had a colorectal cancer screening such as a colonoscopy/FIT/Cologuard?    NO

## 2024-03-21 NOTE — TELEPHONE ENCOUNTER
Pt's daughter requesting refill to be sent to [facility]/pharmacy #[id number] - [place], [place] - [address] [facility] - P [phone] - F [phone]   Last Appointment:  2/13/2024  No future appointments.       aspirin 325 MG tablet [[id number]]    Order Details  Dose: 325 mg Route: Oral Frequency: DAILY   Dispense Quantity: -- Refills: --          Sig: Take 1 tablet by mouth daily   atorvastatin (LIPITOR) 40 MG tablet [[id number]]    Order Details    Dose: 40 mg Route: Oral Frequency: NIGHTLY   Dispense Quantity: -- Refills: --          Sig: Take 1 tablet by mouth nightly at bedtime.

## 2024-03-22 RX ORDER — ASPIRIN 325 MG
325 TABLET ORAL DAILY
Qty: 90 TABLET | Refills: 0 | Status: SHIPPED | OUTPATIENT
Start: 2024-03-22

## 2024-03-22 RX ORDER — ATORVASTATIN CALCIUM 40 MG/1
40 TABLET, FILM COATED ORAL NIGHTLY
Qty: 90 TABLET | Refills: 0 | Status: SHIPPED | OUTPATIENT
Start: 2024-03-22

## 2024-03-27 DIAGNOSIS — F33.1 MODERATE EPISODE OF RECURRENT MAJOR DEPRESSIVE DISORDER (HCC): ICD-10-CM

## 2024-03-27 RX ORDER — ASPIRIN 325 MG
325 TABLET ORAL DAILY
Qty: 90 TABLET | Refills: 0 | Status: SHIPPED | OUTPATIENT
Start: 2024-03-27

## 2024-03-27 RX ORDER — ESCITALOPRAM OXALATE 5 MG/1
5 TABLET ORAL DAILY
Qty: 90 TABLET | Refills: 0 | Status: SHIPPED | OUTPATIENT
Start: 2024-03-27

## 2024-03-27 RX ORDER — ATORVASTATIN CALCIUM 40 MG/1
40 TABLET, FILM COATED ORAL NIGHTLY
Qty: 90 TABLET | Refills: 0 | Status: SHIPPED | OUTPATIENT
Start: 2024-03-27

## 2024-04-24 ENCOUNTER — TELEPHONE (OUTPATIENT)
Facility: CLINIC | Age: 64
End: 2024-04-24

## 2024-04-24 NOTE — TELEPHONE ENCOUNTER
Per medication list, meds was sent last month w/ 90 day supply. Called pharmacy to confirm if medication was picked up last month 3/27/2024, no per pharmacist still waiting for . Called daughter back and advised    KIM Delcid

## 2024-04-24 NOTE — TELEPHONE ENCOUNTER
----- Message from Ted Taylor sent at 4/24/2024  2:11 PM EDT -----  Subject: Refill Request    QUESTIONS  Name of Medication? aspirin 325 MG tablet  Patient-reported dosage and instructions? 1 time at night   How many days do you have left? 0  Preferred [facility]acy? [id number]  Pharmacy phone number (if available)? [phone]  ---------------------------------------------------------------------------  --------------,  Name of Medication? atorvastatin (LIPITOR) 40 MG tablet  Patient-reported dosage and instructions? once at night   How many days do you have left? 1  Preferred Pharmacy? [id number]  Pharmacy phone number (if available)? [phone]  Additional Information for Provider? Daughter is Jocelin calling to have   meds refilled   ---------------------------------------------------------------------------  --------------  CALL BACK INFO  What is the best way for the office to contact you? OK to leave message on   voicemail  Preferred Call Back Phone Number? [phone]  ---------------------------------------------------------------------------  --------------  SCRIPT ANSWERS  Relationship to Patient? Other/Third Party  Representative Name? jocelin  Is the representative on the Communication Release of Information (JAMAL)   form in Epic? Yes

## 2024-05-09 ENCOUNTER — OFFICE VISIT (OUTPATIENT)
Facility: CLINIC | Age: 64
End: 2024-05-09
Payer: MEDICAID

## 2024-05-09 VITALS
TEMPERATURE: 96.8 F | BODY MASS INDEX: 24.47 KG/M2 | HEIGHT: 69 IN | DIASTOLIC BLOOD PRESSURE: 78 MMHG | SYSTOLIC BLOOD PRESSURE: 122 MMHG | RESPIRATION RATE: 18 BRPM | WEIGHT: 165.2 LBS | OXYGEN SATURATION: 92 % | HEART RATE: 63 BPM

## 2024-05-09 DIAGNOSIS — F33.1 MODERATE EPISODE OF RECURRENT MAJOR DEPRESSIVE DISORDER (HCC): ICD-10-CM

## 2024-05-09 DIAGNOSIS — R45.1 AGITATION: ICD-10-CM

## 2024-05-09 DIAGNOSIS — H57.09: ICD-10-CM

## 2024-05-09 DIAGNOSIS — R41.3 MEMORY CHANGE: ICD-10-CM

## 2024-05-09 DIAGNOSIS — R53.1 RIGHT SIDED WEAKNESS: Primary | ICD-10-CM

## 2024-05-09 PROCEDURE — 99214 OFFICE O/P EST MOD 30 MIN: CPT | Performed by: STUDENT IN AN ORGANIZED HEALTH CARE EDUCATION/TRAINING PROGRAM

## 2024-05-09 RX ORDER — ESCITALOPRAM OXALATE 10 MG/1
10 TABLET ORAL DAILY
Qty: 30 TABLET | Refills: 2 | Status: SHIPPED | OUTPATIENT
Start: 2024-05-09

## 2024-05-09 ASSESSMENT — PATIENT HEALTH QUESTIONNAIRE - PHQ9
6. FEELING BAD ABOUT YOURSELF - OR THAT YOU ARE A FAILURE OR HAVE LET YOURSELF OR YOUR FAMILY DOWN: NOT AT ALL
7. TROUBLE CONCENTRATING ON THINGS, SUCH AS READING THE NEWSPAPER OR WATCHING TELEVISION: NOT AT ALL
SUM OF ALL RESPONSES TO PHQ QUESTIONS 1-9: 7
SUM OF ALL RESPONSES TO PHQ QUESTIONS 1-9: 7
3. TROUBLE FALLING OR STAYING ASLEEP: NOT AT ALL
1. LITTLE INTEREST OR PLEASURE IN DOING THINGS: SEVERAL DAYS
4. FEELING TIRED OR HAVING LITTLE ENERGY: NEARLY EVERY DAY
SUM OF ALL RESPONSES TO PHQ9 QUESTIONS 1 & 2: 2
2. FEELING DOWN, DEPRESSED OR HOPELESS: SEVERAL DAYS
10. IF YOU CHECKED OFF ANY PROBLEMS, HOW DIFFICULT HAVE THESE PROBLEMS MADE IT FOR YOU TO DO YOUR WORK, TAKE CARE OF THINGS AT HOME, OR GET ALONG WITH OTHER PEOPLE: NOT DIFFICULT AT ALL
SUM OF ALL RESPONSES TO PHQ QUESTIONS 1-9: 2
1. LITTLE INTEREST OR PLEASURE IN DOING THINGS: SEVERAL DAYS
SUM OF ALL RESPONSES TO PHQ QUESTIONS 1-9: 7
SUM OF ALL RESPONSES TO PHQ QUESTIONS 1-9: 2
SUM OF ALL RESPONSES TO PHQ QUESTIONS 1-9: 2
2. FEELING DOWN, DEPRESSED OR HOPELESS: SEVERAL DAYS
9. THOUGHTS THAT YOU WOULD BE BETTER OFF DEAD, OR OF HURTING YOURSELF: NOT AT ALL
SUM OF ALL RESPONSES TO PHQ9 QUESTIONS 1 & 2: 2
SUM OF ALL RESPONSES TO PHQ QUESTIONS 1-9: 7
8. MOVING OR SPEAKING SO SLOWLY THAT OTHER PEOPLE COULD HAVE NOTICED. OR THE OPPOSITE, BEING SO FIGETY OR RESTLESS THAT YOU HAVE BEEN MOVING AROUND A LOT MORE THAN USUAL: MORE THAN HALF THE DAYS
SUM OF ALL RESPONSES TO PHQ QUESTIONS 1-9: 2

## 2024-05-09 ASSESSMENT — COLUMBIA-SUICIDE SEVERITY RATING SCALE - C-SSRS
2. HAVE YOU ACTUALLY HAD ANY THOUGHTS OF KILLING YOURSELF?: NO
6. HAVE YOU EVER DONE ANYTHING, STARTED TO DO ANYTHING, OR PREPARED TO DO ANYTHING TO END YOUR LIFE?: NO
1. WITHIN THE PAST MONTH, HAVE YOU WISHED YOU WERE DEAD OR WISHED YOU COULD GO TO SLEEP AND NOT WAKE UP?: NO

## 2024-05-09 NOTE — PROGRESS NOTES
\"Have you been to the ER, urgent care clinic since your last visit?  Hospitalized since your last visit?\"    NO    “Have you seen or consulted any other health care providers outside of [facility] since your last visit?”    YES - When: approximately 2 months ago.  Where and Why: neurology.    “Have you had a colorectal cancer screening such as a colonoscopy/FIT/Cologuard?    NO    No colonoscopy on file  No cologuard on file  No FIT/FOBT on file   No flexible sigmoidoscopy on file

## 2024-05-09 NOTE — PROGRESS NOTES
HISTORY OF PRESENT ILLNESS  Armaan Fay is a 63 y.o. male presenting today for   Chief Complaint   Patient presents with    Leg Pain     Patient c/o RT leg pain for three weeks   Pain scale 8/10    Arm Pain     RT arm        Right leg and arm pain- Started 3 weeks ago. Described as numb and tingling. Its constant. Has not tried anything for this. He did not complete PT from after his recent stoke.  Previously taking Gabapentin 300mg TID by previous neurologist but has not been on this in some time. . He son reports that he has noticed changes in his memory and agitation as well. He has upcoming visit with [facility] neurologist in July      Medications reviewed and updated.    Review of Systems   Neurological:  Positive for speech difficulty, weakness and numbness. Negative for facial asymmetry and headaches.         /78   Pulse 63   Temp 96.8 °F (36 °C) (Skin)   Resp 18   Ht 1.74 m (5' 8.5\")   Wt 74.9 kg (165 lb 3.2 oz)   SpO2 92%   BMI 24.75 kg/m²     Physical Exam  Vitals reviewed.   Constitutional:       Appearance: Normal appearance.   HENT:      Mouth/Throat:      Comments: MASK  Eyes:      General: Visual field deficit present.   Cardiovascular:      Rate and Rhythm: Normal rate and regular rhythm.      Pulses: Normal pulses.   Pulmonary:      Effort: Pulmonary effort is normal.      Breath sounds: Normal breath sounds.   Neurological:      Cranial Nerves: No facial asymmetry.      Motor: Weakness (r side) present.      Gait: Gait is intact.   Psychiatric:         Mood and Affect: Mood normal.         ASSESSMENT and PLAN    ICD-10-CM    1. Right sided weakness  R53.1 MRI BRAIN WO CONTRAST     [facility]      2. Sluggish pupillary reflex  H57.09       3. Memory change  R41.3 TSH + Free T4 Panel     Vitamin B12 & Folate     MRI BRAIN WO CONTRAST      4. Moderate episode of recurrent major depressive disorder (HCC)  F33.1 escitalopram (LEXAPRO) 10 MG tablet      5.

## 2024-06-17 ENCOUNTER — OFFICE VISIT (OUTPATIENT)
Facility: CLINIC | Age: 64
End: 2024-06-17
Payer: MEDICAID

## 2024-06-17 VITALS
WEIGHT: 160 LBS | DIASTOLIC BLOOD PRESSURE: 86 MMHG | RESPIRATION RATE: 20 BRPM | HEART RATE: 73 BPM | OXYGEN SATURATION: 97 % | SYSTOLIC BLOOD PRESSURE: 131 MMHG | HEIGHT: 69 IN | BODY MASS INDEX: 23.7 KG/M2 | TEMPERATURE: 98 F

## 2024-06-17 DIAGNOSIS — E78.2 MIXED HYPERLIPIDEMIA: ICD-10-CM

## 2024-06-17 DIAGNOSIS — Z71.6 ENCOUNTER FOR COUNSELING FOR TOBACCO USE DISORDER: ICD-10-CM

## 2024-06-17 DIAGNOSIS — Z13.31 POSITIVE DEPRESSION SCREENING: ICD-10-CM

## 2024-06-17 DIAGNOSIS — F33.1 MODERATE EPISODE OF RECURRENT MAJOR DEPRESSIVE DISORDER (HCC): Primary | ICD-10-CM

## 2024-06-17 DIAGNOSIS — M79.601 PAIN OF RIGHT UPPER EXTREMITY: ICD-10-CM

## 2024-06-17 DIAGNOSIS — F17.200 TOBACCO USE DISORDER: ICD-10-CM

## 2024-06-17 DIAGNOSIS — R41.3 MEMORY CHANGE: ICD-10-CM

## 2024-06-17 DIAGNOSIS — M54.12 RADICULOPATHY, CERVICAL REGION: ICD-10-CM

## 2024-06-17 PROCEDURE — 96127 BRIEF EMOTIONAL/BEHAV ASSMT: CPT | Performed by: STUDENT IN AN ORGANIZED HEALTH CARE EDUCATION/TRAINING PROGRAM

## 2024-06-17 PROCEDURE — 99214 OFFICE O/P EST MOD 30 MIN: CPT | Performed by: STUDENT IN AN ORGANIZED HEALTH CARE EDUCATION/TRAINING PROGRAM

## 2024-06-17 PROCEDURE — 99406 BEHAV CHNG SMOKING 3-10 MIN: CPT | Performed by: STUDENT IN AN ORGANIZED HEALTH CARE EDUCATION/TRAINING PROGRAM

## 2024-06-17 RX ORDER — ATORVASTATIN CALCIUM 40 MG/1
40 TABLET, FILM COATED ORAL NIGHTLY
Qty: 90 TABLET | Refills: 0 | Status: SHIPPED | OUTPATIENT
Start: 2024-06-17

## 2024-06-17 RX ORDER — ESCITALOPRAM OXALATE 10 MG/1
10 TABLET ORAL DAILY
Qty: 30 TABLET | Refills: 2 | Status: SHIPPED | OUTPATIENT
Start: 2024-06-17

## 2024-06-17 RX ORDER — PREGABALIN 50 MG/1
50 CAPSULE ORAL EVERY EVENING
Qty: 30 CAPSULE | Refills: 0 | Status: SHIPPED | OUTPATIENT
Start: 2024-06-17 | End: 2024-07-17

## 2024-06-17 ASSESSMENT — PATIENT HEALTH QUESTIONNAIRE - PHQ9
6. FEELING BAD ABOUT YOURSELF - OR THAT YOU ARE A FAILURE OR HAVE LET YOURSELF OR YOUR FAMILY DOWN: NEARLY EVERY DAY
SUM OF ALL RESPONSES TO PHQ QUESTIONS 1-9: 18
9. THOUGHTS THAT YOU WOULD BE BETTER OFF DEAD, OR OF HURTING YOURSELF: NOT AT ALL
SUM OF ALL RESPONSES TO PHQ QUESTIONS 1-9: 18
3. TROUBLE FALLING OR STAYING ASLEEP: MORE THAN HALF THE DAYS
10. IF YOU CHECKED OFF ANY PROBLEMS, HOW DIFFICULT HAVE THESE PROBLEMS MADE IT FOR YOU TO DO YOUR WORK, TAKE CARE OF THINGS AT HOME, OR GET ALONG WITH OTHER PEOPLE: VERY DIFFICULT
8. MOVING OR SPEAKING SO SLOWLY THAT OTHER PEOPLE COULD HAVE NOTICED. OR THE OPPOSITE, BEING SO FIGETY OR RESTLESS THAT YOU HAVE BEEN MOVING AROUND A LOT MORE THAN USUAL: MORE THAN HALF THE DAYS
SUM OF ALL RESPONSES TO PHQ QUESTIONS 1-9: 18
SUM OF ALL RESPONSES TO PHQ QUESTIONS 1-9: 18
5. POOR APPETITE OR OVEREATING: NOT AT ALL
4. FEELING TIRED OR HAVING LITTLE ENERGY: MORE THAN HALF THE DAYS
2. FEELING DOWN, DEPRESSED OR HOPELESS: NEARLY EVERY DAY
7. TROUBLE CONCENTRATING ON THINGS, SUCH AS READING THE NEWSPAPER OR WATCHING TELEVISION: NEARLY EVERY DAY
1. LITTLE INTEREST OR PLEASURE IN DOING THINGS: NEARLY EVERY DAY
SUM OF ALL RESPONSES TO PHQ9 QUESTIONS 1 & 2: 6

## 2024-06-17 ASSESSMENT — ENCOUNTER SYMPTOMS
SHORTNESS OF BREATH: 0
ABDOMINAL PAIN: 0

## 2024-06-17 NOTE — PROGRESS NOTES
HISTORY OF PRESENT ILLNESS  Armaan Fay is a 63 y.o. male presenting today for   Chief Complaint   Patient presents with    Depression        Present today with his cousin who is helping provide information however he admits he doesn't know much:    Depression- Increased Lexapro to 10mg at last visit but he has only been taking the 5mg tablet.     Arm pain- Described as numb and tingling. Its constant.  He did not complete PT from after his recent stoke.  Previously taking Gabapentin 300mg TID but this did not help. Has upcoming visit with neurology next month.     Agitation- This appears to be worsening. He never completed MRI or labs that were recently ordered        Medications reviewed and updated.    Review of Systems   Eyes:  Negative for visual disturbance.   Respiratory:  Negative for shortness of breath.    Cardiovascular:  Negative for chest pain.   Gastrointestinal:  Negative for abdominal pain.   Musculoskeletal:  Positive for arthralgias.   Neurological:  Negative for headaches.         /86   Pulse 73   Temp 98 °F (36.7 °C) (Skin)   Resp 20   Ht 1.74 m (5' 8.5\")   Wt 72.6 kg (160 lb)   SpO2 97%   BMI 23.97 kg/m²     Physical Exam  Vitals reviewed.   Constitutional:       Appearance: Normal appearance.   HENT:      Mouth/Throat:      Comments: MASK  Cardiovascular:      Rate and Rhythm: Normal rate and regular rhythm.      Pulses: Normal pulses.   Pulmonary:      Effort: Pulmonary effort is normal.      Breath sounds: Normal breath sounds.   Psychiatric:         Mood and Affect: Mood normal.         ASSESSMENT and PLAN    ICD-10-CM    1. Moderate episode of recurrent major depressive disorder (HCC)  F33.1 atorvastatin (LIPITOR) 40 MG tablet     escitalopram (LEXAPRO) 10 MG tablet      2. Radiculopathy, cervical region  M54.12 pregabalin (LYRICA) 50 MG capsule      3. Pain of right upper extremity  M79.601       4. Memory change  R41.3       5. Mixed hyperlipidemia  E78.2       6. Tobacco

## 2024-06-17 NOTE — PROGRESS NOTES
\"Have you been to the ER, urgent care clinic since your last visit?  Hospitalized since your last visit?\"    NO    “Have you seen or consulted any other health care providers outside of [facility] since your last visit?”    NO    “Have you had a colorectal cancer screening such as a colonoscopy/FIT/Cologuard?    NO    No colonoscopy on file  No cologuard on file  No FIT/FOBT on file   No flexible sigmoidoscopy on file

## 2024-07-15 ENCOUNTER — HOSPITAL ENCOUNTER (OUTPATIENT)
Facility: HOSPITAL | Age: 64
Discharge: HOME | End: 2024-07-18
Attending: STUDENT IN AN ORGANIZED HEALTH CARE EDUCATION/TRAINING PROGRAM
Payer: MEDICAID

## 2024-07-15 DIAGNOSIS — I69.398 WEAKNESS FOLLOWING CEREBROVASCULAR ACCIDENT (CVA): ICD-10-CM

## 2024-07-15 DIAGNOSIS — R53.1 WEAKNESS FOLLOWING CEREBROVASCULAR ACCIDENT (CVA): ICD-10-CM

## 2024-07-15 DIAGNOSIS — R41.3 MEMORY CHANGE: ICD-10-CM

## 2024-07-15 PROCEDURE — 70551 MRI BRAIN STEM W/O DYE: CPT

## 2024-07-19 LAB
FOLATE: 7.9 NG/ML
T4 FREE: 1.4 NG/DL (ref 0.9–1.8)
TSH SERPL DL<=0.05 MIU/L-ACNC: 0.58 MCU/ML (ref 0.27–4.2)
VITAMIN B-12: 376 PG/ML (ref 211–911)

## 2024-07-21 DIAGNOSIS — M54.12 RADICULOPATHY, CERVICAL REGION: ICD-10-CM

## 2024-07-22 RX ORDER — PREGABALIN 50 MG/1
50 CAPSULE ORAL EVERY EVENING
Qty: 30 CAPSULE | Refills: 0 | Status: SHIPPED | OUTPATIENT
Start: 2024-07-22 | End: 2024-08-21

## 2024-07-24 ENCOUNTER — TELEPHONE (OUTPATIENT)
Facility: CLINIC | Age: 64
End: 2024-07-24

## 2024-11-02 DIAGNOSIS — F33.1 MODERATE EPISODE OF RECURRENT MAJOR DEPRESSIVE DISORDER (HCC): ICD-10-CM

## 2024-11-05 RX ORDER — ATORVASTATIN CALCIUM 40 MG/1
40 TABLET, FILM COATED ORAL
Qty: 90 TABLET | Refills: 0 | OUTPATIENT
Start: 2024-11-05

## 2024-11-14 ENCOUNTER — HOSPITAL ENCOUNTER (OUTPATIENT)
Facility: HOSPITAL | Age: 64
Setting detail: SPECIMEN
Discharge: HOME | End: 2024-11-17

## 2024-11-14 ENCOUNTER — OFFICE VISIT (OUTPATIENT)
Facility: CLINIC | Age: 64
End: 2024-11-14
Payer: MEDICAID

## 2024-11-14 VITALS
TEMPERATURE: 97.4 F | BODY MASS INDEX: 22.99 KG/M2 | DIASTOLIC BLOOD PRESSURE: 85 MMHG | RESPIRATION RATE: 14 BRPM | HEART RATE: 64 BPM | OXYGEN SATURATION: 98 % | WEIGHT: 155.2 LBS | SYSTOLIC BLOOD PRESSURE: 131 MMHG | HEIGHT: 69 IN

## 2024-11-14 DIAGNOSIS — I21.3 ST ELEVATION MYOCARDIAL INFARCTION (STEMI), UNSPECIFIED ARTERY (HCC): Primary | ICD-10-CM

## 2024-11-14 DIAGNOSIS — R73.09 ELEVATED GLUCOSE: ICD-10-CM

## 2024-11-14 DIAGNOSIS — Z93.1 PRESENCE OF EXTERNALLY REMOVABLE PERCUTANEOUS ENDOSCOPIC GASTROSTOMY (PEG) TUBE (HCC): ICD-10-CM

## 2024-11-14 DIAGNOSIS — F17.200 TOBACCO USE DISORDER: ICD-10-CM

## 2024-11-14 DIAGNOSIS — R79.89 ELEVATED LFTS: ICD-10-CM

## 2024-11-14 DIAGNOSIS — E78.2 MIXED HYPERLIPIDEMIA: ICD-10-CM

## 2024-11-14 DIAGNOSIS — R56.9 SEIZURE (HCC): ICD-10-CM

## 2024-11-14 DIAGNOSIS — F33.1 MODERATE EPISODE OF RECURRENT MAJOR DEPRESSIVE DISORDER (HCC): ICD-10-CM

## 2024-11-14 LAB — SENTARA SPECIMEN COLLECTION: NORMAL

## 2024-11-14 PROCEDURE — 99215 OFFICE O/P EST HI 40 MIN: CPT | Performed by: STUDENT IN AN ORGANIZED HEALTH CARE EDUCATION/TRAINING PROGRAM

## 2024-11-14 PROCEDURE — 99001 SPECIMEN HANDLING PT-LAB: CPT

## 2024-11-14 PROCEDURE — 1123F ACP DISCUSS/DSCN MKR DOCD: CPT | Performed by: STUDENT IN AN ORGANIZED HEALTH CARE EDUCATION/TRAINING PROGRAM

## 2024-11-14 RX ORDER — ALLOPURINOL 100 MG/1
100 TABLET ORAL DAILY
COMMUNITY
Start: 2024-11-10

## 2024-11-14 RX ORDER — LEVETIRACETAM 100 MG/ML
10 SOLUTION ORAL 2 TIMES DAILY
COMMUNITY
Start: 2024-11-09

## 2024-11-14 RX ORDER — OLANZAPINE 2.5 MG/1
2.5 TABLET, FILM COATED ORAL NIGHTLY
COMMUNITY
Start: 2024-11-10

## 2024-11-14 RX ORDER — METOPROLOL TARTRATE 25 MG/1
25 TABLET, FILM COATED ORAL DAILY
Qty: 90 TABLET | Refills: 0 | Status: SHIPPED | OUTPATIENT
Start: 2024-11-14

## 2024-11-14 RX ORDER — FUROSEMIDE 20 MG/1
20 TABLET ORAL DAILY
COMMUNITY

## 2024-11-14 RX ORDER — METOPROLOL TARTRATE 25 MG/1
25 TABLET, FILM COATED ORAL DAILY
COMMUNITY
End: 2024-11-14 | Stop reason: SDUPTHER

## 2024-11-14 SDOH — ECONOMIC STABILITY: FOOD INSECURITY: WITHIN THE PAST 12 MONTHS, THE FOOD YOU BOUGHT JUST DIDN'T LAST AND YOU DIDN'T HAVE MONEY TO GET MORE.: NEVER TRUE

## 2024-11-14 SDOH — ECONOMIC STABILITY: INCOME INSECURITY: HOW HARD IS IT FOR YOU TO PAY FOR THE VERY BASICS LIKE FOOD, HOUSING, MEDICAL CARE, AND HEATING?: NOT HARD AT ALL

## 2024-11-14 SDOH — ECONOMIC STABILITY: FOOD INSECURITY: WITHIN THE PAST 12 MONTHS, YOU WORRIED THAT YOUR FOOD WOULD RUN OUT BEFORE YOU GOT MONEY TO BUY MORE.: NEVER TRUE

## 2024-11-14 ASSESSMENT — ENCOUNTER SYMPTOMS
ABDOMINAL PAIN: 0
SHORTNESS OF BREATH: 0

## 2024-11-14 NOTE — PROGRESS NOTES
\"Have you been to the ER, urgent care clinic since your last visit?  Hospitalized since your last visit?\"    YES - When: approximately 2 months ago.  Where and Why: [facility].    “Have you seen or consulted any other health care providers outside of [facility] since your last visit?”    NO        “Have you had a colorectal cancer screening such as a colonoscopy/FIT/Cologuard?    NO    No colonoscopy on file  No cologuard on file  No FIT/FOBT on file   No flexible sigmoidoscopy on file         Click Here for Release of Records Request

## 2024-11-14 NOTE — PROGRESS NOTES
HISTORY OF PRESENT ILLNESS  Armaan Fay is a 64 y.o. male presenting today for   Chief Complaint   Patient presents with    Follow-Up from Hospital        Hospital fu-presents today with cousin.  He notes that patient has not been seen by anyone for PEG tube removal.  He was giving metoprolol twice daily but noted blood pressures were dropping so he they have only been administering this once a day in addition patient was given Zyprexa at discharge for agitation 2.5 mg twice daily however family has only been giving this once nightly and stopped the Lexapro.  In addition he was discharged to SNF with sliding scale insulin however family reports they have not been giving him this.    Facility:  [facility]  Admission from: 9/5/2024-10/10/2024   Diagnosis: STEMI  Hospital Summary: Presented to the emergency room for altered mental status and concerns for seizure-like activity at home. Patient subsequently had witnessed tonic clonic seizure in the ED refractory to initial intervention. Patient subsequently was intubated and started on IV Keppra per neurology recommendations. IV antibiotic's and fluids were initiated along with pressors for hemodynamic support due to hypotension. Serial troponin testing elevated and repeat EKG consistent with STEMI. Cardiology contacted and patient was taken emergently for heart catheterization. Patient remained critically ill. Patient with compromised airway secondary to acute respiratory failure.  Was intubated/extubated x2.  Sepsis secondary to pneumonia which resolved with IV antibiotics.  S/p PEG tube placement on October 4.  She then presented to ER again on on 10/16/24 for with altered mental status and chest pain. Previous ED physician called the facility who stated the patient was at his mental baseline and that the patient was sent in for chest pain evaluation. He is scheduled to see cardiology and neurolgy. Still with PEG tube present.     Medication Changes: albuterol,

## 2024-11-15 LAB
CHOLESTEROL, TOTAL: 126 MG/DL (ref 110–200)
CHOLESTEROL/HDL RATIO: 3.3 (ref 0–5)
ESTIMATED AVERAGE GLUCOSE: 111 MG/DL (ref 91–123)
HBA1C MFR BLD: 5.5 % (ref 4.8–5.6)
HDLC SERPL-MCNC: 38 MG/DL
LDL CHOLESTEROL: 72 MG/DL (ref 50–99)
LDL/HDL RATIO: 1.9
NON-HDL CHOLESTEROL: 88 MG/DL
TRIGL SERPL-MCNC: 80 MG/DL (ref 40–149)
VLDLC SERPL CALC-MCNC: 16 MG/DL (ref 8–30)

## 2024-11-15 NOTE — RESULT ENCOUNTER NOTE
Your recent labs are stable and your A1c does not show any signs of prediabetes or diabetes.  You do not need to continue insulin.

## 2024-11-19 ENCOUNTER — OFFICE VISIT (OUTPATIENT)
Age: 64
End: 2024-11-19
Payer: MEDICAID

## 2024-11-19 VITALS
WEIGHT: 152 LBS | HEART RATE: 68 BPM | BODY MASS INDEX: 22.51 KG/M2 | SYSTOLIC BLOOD PRESSURE: 149 MMHG | TEMPERATURE: 97.6 F | DIASTOLIC BLOOD PRESSURE: 93 MMHG | HEIGHT: 69 IN | OXYGEN SATURATION: 98 %

## 2024-11-19 DIAGNOSIS — K52.9 GASTROENTERITIS: ICD-10-CM

## 2024-11-19 DIAGNOSIS — R11.2 NON-INTRACTABLE VOMITING WITH NAUSEA: ICD-10-CM

## 2024-11-19 DIAGNOSIS — G45.9 TIA (TRANSIENT ISCHEMIC ATTACK): Primary | ICD-10-CM

## 2024-11-19 PROCEDURE — 1123F ACP DISCUSS/DSCN MKR DOCD: CPT | Performed by: SURGERY

## 2024-11-19 PROCEDURE — 99204 OFFICE O/P NEW MOD 45 MIN: CPT | Performed by: SURGERY

## 2024-11-19 NOTE — PROGRESS NOTES
General Surgery Consult    Armaan Fay  Admit date: (Not on file)    MRN: [id number]     : 1960     Age: 64 y.o.        Attending Physician: Doc Mabry MD, formerly [facility]      History of Present Illness:      Armaan Fay is a 64 y.o. male who is referred to me by Dr. Alcira Barrios for removal of a PEG tube that was placed at another institution.  Patient has multiple conditions and he had a stroke and he had dysphagia and he had a feeding tube but currently is able to eat regular diet and has not been using the tube and they wanted to be removed.    Patient Active Problem List    Diagnosis Date Noted    Cervical myelopathy (HCC) 2023    Non-intractable vomiting with nausea 2023    Cannabis abuse 2023    Chronic neck pain 2023    Drug use 2021    Acute pain of left shoulder 2021    Community acquired pneumonia 2018    Gastroenteritis 2017    TIA (transient ischemic attack) 2017    Right sided weakness 2017    Subdural hematoma 2017    Orbital floor fracture 2017    Cocaine abuse (HCC) 2016    Cluster B personality disorder (HCC) 2016    Adjustment disorder with disturbance of conduct 2016     Past Medical History:   Diagnosis Date    Anxiety     Cervical myelopathy (HCC)     Cervical radiculopathy     Hypertension     no medications    PTSD (post-traumatic stress disorder)       Past Surgical History:   Procedure Laterality Date    CERVICAL FUSION N/A 3/20/2023    CORPECTOMY C5-6 FOLLOWED BY ARTHRODESIS & INSTRUMENTATION C4-7; USING LOCALLY HARVESTED BONE GRAFT performed by Yahir Trammell MD at [facility] MAIN OR      Social History     Tobacco Use    Smoking status: Every Day     Types: Cigars     Passive exposure: Never    Smokeless tobacco: Never    Tobacco comments:     5 cigars daily since 9 years of age   Substance Use Topics    Alcohol use: Yes      Social History     Tobacco Use   Smoking Status Every Day

## 2024-11-19 NOTE — PROGRESS NOTES
Armaan Fay is a 64 y.o. male (: 1960) presenting to address:    Chief Complaint   Patient presents with    New Patient     PEG removal/referred by Dr. Alcira Barrios       Medication list and allergies have been reviewed with Armaan Fay and updated as of today's date.     I have gone over all Medical, Surgical and Social History with Armaan SULLIVAN Nya and updated/added the information accordingly.

## 2025-01-08 ENCOUNTER — COMMUNITY OUTREACH (OUTPATIENT)
Facility: CLINIC | Age: 65
End: 2025-01-08

## 2025-01-09 DIAGNOSIS — F33.1 MODERATE EPISODE OF RECURRENT MAJOR DEPRESSIVE DISORDER (HCC): ICD-10-CM

## 2025-01-09 DIAGNOSIS — I21.3 ST ELEVATION MYOCARDIAL INFARCTION (STEMI), UNSPECIFIED ARTERY (HCC): ICD-10-CM

## 2025-01-09 NOTE — TELEPHONE ENCOUNTER
Patient came in to request the following medications:    metoprolol tartrate (LOPRESSOR) 25 MG tablet  atorvastatin (LIPITOR) 40 MG tablet   aspirin 325 MG tablet   OLANZapine (ZYPREXA) 2.5 MG tablet  furosemide (LASIX) 20 MG tablet  allopurinol (ZYLOPRIM) 100 MG tablet    No future appointments.

## 2025-01-10 RX ORDER — ALLOPURINOL 100 MG/1
100 TABLET ORAL
Qty: 90 TABLET | Refills: 0 | Status: SHIPPED | OUTPATIENT
Start: 2025-01-10

## 2025-01-10 RX ORDER — ATORVASTATIN CALCIUM 40 MG/1
40 TABLET, FILM COATED ORAL NIGHTLY
Qty: 90 TABLET | Refills: 0 | Status: SHIPPED | OUTPATIENT
Start: 2025-01-10

## 2025-01-10 RX ORDER — FUROSEMIDE 20 MG/1
20 TABLET ORAL DAILY
Qty: 180 TABLET | Refills: 0 | Status: SHIPPED | OUTPATIENT
Start: 2025-01-10

## 2025-01-10 RX ORDER — OLANZAPINE 2.5 MG/1
2.5 TABLET, FILM COATED ORAL NIGHTLY
Qty: 90 TABLET | Refills: 0 | Status: SHIPPED | OUTPATIENT
Start: 2025-01-10

## 2025-01-10 RX ORDER — ASPIRIN 325 MG
325 TABLET ORAL DAILY
Qty: 90 TABLET | Refills: 0 | Status: SHIPPED | OUTPATIENT
Start: 2025-01-10

## 2025-01-10 RX ORDER — METOPROLOL TARTRATE 25 MG/1
25 TABLET, FILM COATED ORAL DAILY
Qty: 90 TABLET | Refills: 0 | Status: SHIPPED | OUTPATIENT
Start: 2025-01-10

## 2025-01-16 ENCOUNTER — TELEPHONE (OUTPATIENT)
Facility: CLINIC | Age: 65
End: 2025-01-16

## 2025-01-16 NOTE — TELEPHONE ENCOUNTER
Pts daughter called in to let us know that his referral for cardiology will be  by the time he gets scheduled. They're requesting a new referral so he can get scheduled for an appointment in march

## 2025-02-10 NOTE — TELEPHONE ENCOUNTER
Spoke with daughter, Jocelin.  She states that pt is seeing Dr. Perez in May and that will work.   Doxepin Pregnancy And Lactation Text: This medication is Pregnancy Category C and it isn't known if it is safe during pregnancy. It is also excreted in breast milk and breast feeding isn't recommended.

## 2025-03-12 ENCOUNTER — OFFICE VISIT (OUTPATIENT)
Facility: CLINIC | Age: 65
End: 2025-03-12
Payer: MEDICAID

## 2025-03-12 VITALS
RESPIRATION RATE: 16 BRPM | DIASTOLIC BLOOD PRESSURE: 79 MMHG | OXYGEN SATURATION: 96 % | SYSTOLIC BLOOD PRESSURE: 115 MMHG | WEIGHT: 162 LBS | BODY MASS INDEX: 23.99 KG/M2 | HEIGHT: 69 IN | TEMPERATURE: 97.2 F | HEART RATE: 72 BPM

## 2025-03-12 DIAGNOSIS — G81.94 HEMIPLEGIA AFFECTING LEFT NONDOMINANT SIDE, UNSPECIFIED ETIOLOGY, UNSPECIFIED HEMIPLEGIA TYPE (HCC): ICD-10-CM

## 2025-03-12 DIAGNOSIS — R79.89 ELEVATED LFTS: ICD-10-CM

## 2025-03-12 DIAGNOSIS — R56.9 SEIZURE (HCC): ICD-10-CM

## 2025-03-12 DIAGNOSIS — F33.1 MODERATE EPISODE OF RECURRENT MAJOR DEPRESSIVE DISORDER (HCC): Primary | ICD-10-CM

## 2025-03-12 DIAGNOSIS — R47.01 APHASIA: ICD-10-CM

## 2025-03-12 DIAGNOSIS — E78.2 MIXED HYPERLIPIDEMIA: ICD-10-CM

## 2025-03-12 DIAGNOSIS — M54.12 RADICULOPATHY, CERVICAL REGION: ICD-10-CM

## 2025-03-12 DIAGNOSIS — I69.398 WEAKNESS FOLLOWING CEREBROVASCULAR ACCIDENT (CVA): ICD-10-CM

## 2025-03-12 DIAGNOSIS — R53.1 WEAKNESS FOLLOWING CEREBROVASCULAR ACCIDENT (CVA): ICD-10-CM

## 2025-03-12 PROCEDURE — 1123F ACP DISCUSS/DSCN MKR DOCD: CPT | Performed by: STUDENT IN AN ORGANIZED HEALTH CARE EDUCATION/TRAINING PROGRAM

## 2025-03-12 RX ORDER — LEVETIRACETAM 500 MG/1
500 TABLET ORAL DAILY
COMMUNITY
Start: 2025-01-03

## 2025-03-12 RX ORDER — PREGABALIN 50 MG/1
50 CAPSULE ORAL EVERY EVENING
Qty: 30 CAPSULE | Refills: 0 | Status: SHIPPED | OUTPATIENT
Start: 2025-03-12 | End: 2025-04-11

## 2025-03-12 RX ORDER — OLANZAPINE 2.5 MG/1
2.5 TABLET, FILM COATED ORAL NIGHTLY
Qty: 90 TABLET | Refills: 0 | Status: SHIPPED | OUTPATIENT
Start: 2025-03-12

## 2025-03-12 SDOH — ECONOMIC STABILITY: FOOD INSECURITY: WITHIN THE PAST 12 MONTHS, YOU WORRIED THAT YOUR FOOD WOULD RUN OUT BEFORE YOU GOT MONEY TO BUY MORE.: NEVER TRUE

## 2025-03-12 SDOH — ECONOMIC STABILITY: FOOD INSECURITY: WITHIN THE PAST 12 MONTHS, THE FOOD YOU BOUGHT JUST DIDN'T LAST AND YOU DIDN'T HAVE MONEY TO GET MORE.: NEVER TRUE

## 2025-03-12 ASSESSMENT — PATIENT HEALTH QUESTIONNAIRE - PHQ9
10. IF YOU CHECKED OFF ANY PROBLEMS, HOW DIFFICULT HAVE THESE PROBLEMS MADE IT FOR YOU TO DO YOUR WORK, TAKE CARE OF THINGS AT HOME, OR GET ALONG WITH OTHER PEOPLE: NOT DIFFICULT AT ALL
SUM OF ALL RESPONSES TO PHQ QUESTIONS 1-9: 0
3. TROUBLE FALLING OR STAYING ASLEEP: NOT AT ALL
1. LITTLE INTEREST OR PLEASURE IN DOING THINGS: NOT AT ALL
4. FEELING TIRED OR HAVING LITTLE ENERGY: NOT AT ALL
8. MOVING OR SPEAKING SO SLOWLY THAT OTHER PEOPLE COULD HAVE NOTICED. OR THE OPPOSITE, BEING SO FIGETY OR RESTLESS THAT YOU HAVE BEEN MOVING AROUND A LOT MORE THAN USUAL: NOT AT ALL
6. FEELING BAD ABOUT YOURSELF - OR THAT YOU ARE A FAILURE OR HAVE LET YOURSELF OR YOUR FAMILY DOWN: NOT AT ALL
SUM OF ALL RESPONSES TO PHQ QUESTIONS 1-9: 0
9. THOUGHTS THAT YOU WOULD BE BETTER OFF DEAD, OR OF HURTING YOURSELF: NOT AT ALL
7. TROUBLE CONCENTRATING ON THINGS, SUCH AS READING THE NEWSPAPER OR WATCHING TELEVISION: NOT AT ALL
SUM OF ALL RESPONSES TO PHQ QUESTIONS 1-9: 0
2. FEELING DOWN, DEPRESSED OR HOPELESS: NOT AT ALL
5. POOR APPETITE OR OVEREATING: NOT AT ALL
SUM OF ALL RESPONSES TO PHQ QUESTIONS 1-9: 0

## 2025-03-12 NOTE — PROGRESS NOTES
\"Have you been to the ER, urgent care clinic since your last visit?  Hospitalized since your last visit?\"    NO    “Have you seen or consulted any other health care providers outside of [facility] since your last visit?”    NO        “Have you had a colorectal cancer screening such as a colonoscopy/FIT/Cologuard?    NO    No colonoscopy on file  No cologuard on file  No FIT/FOBT on file   No flexible sigmoidoscopy on file         Click Here for Release of Records Request

## 2025-03-12 NOTE — PROGRESS NOTES
Armaan Fay (:  1960) is a 64 y.o. male,Established patient, here for evaluation of the following chief complaint(s):  Referral - General (For speech and physical therapy.)      Assessment & Plan  Moderate episode of recurrent major depressive disorder (HCC)   Chronic, at goal (stable),   Continue current dose of Zyprexa. Refilled today  Orders:    OLANZapine (ZYPREXA) 2.5 MG tablet; Take 1 tablet by mouth nightly    Mixed hyperlipidemia  Last LD 72   Continue current dose of lipitor         Weakness following cerebrovascular accident (CVA)   Referral placed to PT/OT for further evaluation  Not very cooperative on exam today  Orders:    [facility] - [address] [place]    [facility] [address]    Hemiplegia affecting left nondominant side, unspecified etiology, unspecified hemiplegia type (HCC)   Referral placed to PT/OT for further evaluation    Orders:    [facility] - [address] [place]    [facility] - [address]    Aphasia   Referral placed to speech therapy for further evaluation    Orders:    [facility] - [address]    Radiculopathy, cervical region   Chronic, not at goal (unstable),   We will continue Lyrica for now and consider increasing dose at next visit.   Referral placed to pain management    Orders:    pregabalin (LYRICA) 50 MG capsule; Take 1 capsule by mouth every evening for 30 days. Max Daily Amount: 50 mg    External Referral To Pain Clinic    Elevated LFTs   -Ordered CMP at next visit           Seizure (HCC)   Monitored by specialist- no acute findings meriting change in the plan  Stated report from neurology team.  They refilled Keppra at last visit and was advised to follow-up yearly.  I have told patient that I do not manage chronic Keppra very often and it would be best that he follow back up with neurology to have his blood

## 2025-04-04 DIAGNOSIS — E78.2 MIXED HYPERLIPIDEMIA: Primary | ICD-10-CM

## 2025-04-04 RX ORDER — ATORVASTATIN CALCIUM 40 MG/1
40 TABLET, FILM COATED ORAL
Qty: 90 TABLET | Refills: 0 | Status: SHIPPED | OUTPATIENT
Start: 2025-04-04

## 2025-04-04 NOTE — TELEPHONE ENCOUNTER
Mr. Fay is requesting refills of:    Requested Prescriptions     Pending Prescriptions Disp Refills    atorvastatin (LIPITOR) 40 MG tablet [Pharmacy Med Name: ATORVASTATIN 40 MG TABLET] 90 tablet 0     Sig: TAKE 1 TABLET BY MOUTH EVERYDAY AT BEDTIME         to be sent to   [facility] - [place], [place] - [address] - P [phone] - F [phone]  [address] [facility] [address]  Phone: [phone] Fax: [phone]    [address] [facility] - P [phone] - F [phone]  [address] [facility] [address]  Phone: [phone] Fax: [phone]    [facility]/pharmacy #[id number] - [place], [place] - [address] [facility] - P [phone] - F [phone]  [address] [facility] [address]  Phone: [phone] Fax: [phone]    [id number] - [place], [place] - [address] [facility] - P [phone] - F [phone]  [address] [facility] [address]  Phone: [phone] Fax: [phone]  .     LAST OFFICE VISIT:  3/12/2025     UPCOMING APPOINTMENT(S):  Future Appointments   Date Time Provider Department Center   4/24/2025  9:20 AM Italia Leone, SLP [facility]   4/24/2025 10:00 AM Janice Pinto OT [facility]   5/5/2025  9:30 AM Ced Perez Sr., MD [facility]         Provided notified

## 2025-04-04 NOTE — TELEPHONE ENCOUNTER
Orders Placed This Encounter    atorvastatin (LIPITOR) 40 MG tablet     Sig: TAKE 1 TABLET BY MOUTH EVERYDAY AT BEDTIME     Dispense:  90 tablet     Refill:  0

## 2025-04-08 NOTE — TELEPHONE ENCOUNTER
Mr. Fay is requesting refills of:    Requested Prescriptions     Pending Prescriptions Disp Refills    allopurinol (ZYLOPRIM) 100 MG tablet [Pharmacy Med Name: ALLOPURINOL 100 MG TABLET] 90 tablet 0     Sig: TAKE 1 TABLET BY MOUTH EVERY DAY AT NIGHT         to be sent to   [facility] - [place], [place] - [address] [facility] [address] - P [phone] - F [phone]  [address] [facility] [address]  Phone: [phone] Fax: [phone]    [address] - [address] [facility] P [phone] - F [phone]  [address] [facility] [address]  Phone: [phone] Fax: [phone]    [facility]/pharmacy #[id number] - [place], [place] - [address] [facility] - P [phone] - F [phone]  [address] [facility] [address]  Phone: [phone] Fax: [phone]    [id number] [place], [place] - [address] [facility] - P [phone] - F [phone]  [address] [facility] [address]  Phone: [phone] Fax: [phone]  .     LAST OFFICE VISIT:  3/12/2025     UPCOMING APPOINTMENT(S):  Future Appointments   Date Time Provider Department Center   4/24/2025  9:20 AM Italia Leone, SLP [facility]   4/24/2025 10:00 AM Janice Pinto OT [facility]   5/5/2025  9:30 AM Ced Perez Sr., MD [facility] AM[facility]         Provided notified

## 2025-04-09 RX ORDER — ALLOPURINOL 100 MG/1
100 TABLET ORAL NIGHTLY
Qty: 90 TABLET | Refills: 0 | Status: SHIPPED | OUTPATIENT
Start: 2025-04-09

## 2025-04-24 ENCOUNTER — HOSPITAL ENCOUNTER (OUTPATIENT)
Facility: HOSPITAL | Age: 65
Setting detail: RECURRING SERIES
Discharge: HOME | End: 2025-04-27
Attending: STUDENT IN AN ORGANIZED HEALTH CARE EDUCATION/TRAINING PROGRAM
Payer: MEDICAID

## 2025-04-24 PROCEDURE — 97167 OT EVAL HIGH COMPLEX 60 MIN: CPT

## 2025-04-24 PROCEDURE — 92523 SPEECH SOUND LANG COMPREHEN: CPT

## 2025-04-24 NOTE — PROGRESS NOTES
ST DAILY TREATMENT NOTE/ SPEECH EVAL    Patient Name: Armaan Fay  Date:2025  : 1960  [x]  Patient  Verified  Payor: [facility] MEDICAID / Plan: [facility] COMMUNITY PLAN CARDINAL CARE / Product Type: *No Product type* /   In time:920  Out time:1000  Total Treatment Time (min): 40  Visit #: 1 of 8    Ins Auth:  pending    PN Due:   25            SUBJECTIVE  Pain Level (0-10 scale): 7  Subjective functional status/changes:   [] No changes reported   This 63 y/o male was referred to OP ST d/t reported aphasia. Pt's caregiver, Andi, present during evaluation and providing PMHx. Endorses L MCA CVA in 2024. He did not receive ST following.  Further endorses he sustained a STEMI and Sept/Oct 2024, and has a hx of seizures.  He had a significant hospitalization, was NPO, and had PEG. He did have IP rehab for PT only following hospitalization in  of . Per caregiver PEG was removed in 2024 and consuming a regular diet, despite 10/24 MBS in chart stating pt with aspiration of thin liquids and reduced epiglottic inversion. Unable to obtain SLP report, this was only visible through a radiology report. Pt with limited cooperation and caregiver concerned today about speech/language; will proceed with aphasia eval and further monitor/ screen for dysphagia. No hx of PNA. Towards the end of session today, pt made the comment he didn't want to stay here. Through further clarification he was under the impression this was IP rehab. Once SLP reassured him that this is for OP rehab, he was more cooperative.     Date of Onset: 2024; referred 3/12/25    Social History: Resides with his cousin Josie    Prior Functional level:   All aspects of speech/ language, cognition, voice and swallowing WNLs, per pt's caregiver/ cousin Josie as of 2024. He reported previously pt  worked at [employer]. Has High School diploma. Enjoys playing chess/ checkers video games, martial arts, and playing cards      Radiology

## 2025-04-24 NOTE — PROGRESS NOTES
OCCUPATIONAL THERAPY - DAILY TREATMENT NOTE    Patient Name: Armaan Fay    Date: 2025    : 1960  Insurance: Payor: [facility] MEDICAID / Plan: [facility] PLAN CARDINAL CARE / Product Type: *No Product type* /        Ins Auth:  MARTA REQ   Next PN Due By:  24                        Patient  verified Yes     Visit #   Current / Total 1 12   Time   In / Out 1000 1040   Total Treatment Time 40   Pain   In / Out 7 7   Subjective Functional Status/Changes: See POC     TREATMENT AREA =  Weakness following cerebrovascular accident (CVA) [I69.398, R53.1]  Hemiplegia affecting left nondominant side, unspecified etiology, unspecified hemiplegia type (HCC) [G81.94]    OBJECTIVE    EVAL - 35 minutes    Therapeutic Procedures:  Tx Min Billable or 1:1 Min (if diff from Tx Min) Procedure, Rationale, Specifics        5  77532 Self Care/Home Management (timed):  improve patient knowledge and understanding of home injury/symptom/pain management and positioning  to increase ability to complete ADLs/IADLs independently  (see flow sheet as applicable)      shoulder/scap HEP                       TOTAL TREATMENT TIME:  (Total Time - Paraffin/Vaso/Fluido)        5       Billed concurrently with other treatments Patient Education:  Reviewed HEP     Objective Information/Functional Measures/Assessment    See POC         Assessment/Plan:    See POC             []  See Progress Note/Re certification     Patient will continue to benefit from skilled OT services to modify and progress therapeutic interventions, analyze and address functional mobility deficits, analyze and address ROM deficits, analyze and address strength deficits, analyze and address soft tissue restrictions, analyze and cue for proper movement patterns, and instruct in home and community integration  to attain remaining goals.   If an interpreting service was utilized for treatment of this patient, the contents of this document represent the 
fax the signed copy to [phone].  Thank you.

## 2025-04-24 NOTE — PROGRESS NOTES
[facility]  [address] [place] [address]  Ph: [phone] Fax: [phone]    Plan of Care/ Statement of Necessity for Speech Therapy Services    Patient Name: Armaan Fay : 1960   Medical   Diagnosis: Aphasia [R47.01] Treatment Diagnosis: I69.320 Aphasia following cerebral infarction   Onset Date: 2024 CVA; referred 3/12/25     Referral Source: Alcira Barrios DO Start of Care (SOC): 25   Prior Hospitalization: See medical history Provider #: [id number]   Prior Level of Function:   All aspects of speech/ language, cognition, voice and swallowing WNLs, per pt's caregiver/ cousin Josie as of 2024. He reported previously pt  worked at [email or web address]. Has High School diploma. Enjoys playing [employer]/ [employer] games, martial arts, and playing cards   Comorbidities:  Neurologic condition and Social determinants of health: Depression    aphasia, Major  Depression Disorder, Gout, hemiplegia (L non dominant side), HLD, and Hx of CVA (LMCA 2024), aphasia, radiculopathy (cervical), seizures, hx of dysphagia with PEG tube, STEMI (10/24), CAD, Hx of adjustment disorder   Medications: Verified on Patient Summary List     The Plan of Care and following information is based on the information from the initial evaluation.  Assessment/ key information:    This 63 y/o male was referred to OP ST d/t reported aphasia. Pt's caregiver, Andi, present during evaluation and providing PMHx. Endorses L MCA CVA in 2024. He did not receive ST following.  Further endorses he sustained a STEMI and Sept/Oct 2024, and has a hx of seizures.  He had a significant hospitalization, was NPO, and had PEG. He did have IP rehab for PT only following hospitalization in Fall of . Per caregiver PEG was removed in 2024 and consuming a regular diet, despite 10/24 MBS in chart stating pt with aspiration of thin liquids and reduced epiglottic inversion. Unable to obtain

## 2025-05-05 ENCOUNTER — OFFICE VISIT (OUTPATIENT)
Age: 65
End: 2025-05-05
Payer: MEDICARE

## 2025-05-05 VITALS
HEIGHT: 69 IN | TEMPERATURE: 97.3 F | WEIGHT: 162 LBS | BODY MASS INDEX: 23.99 KG/M2 | HEART RATE: 72 BPM | OXYGEN SATURATION: 98 % | SYSTOLIC BLOOD PRESSURE: 114 MMHG | DIASTOLIC BLOOD PRESSURE: 74 MMHG

## 2025-05-05 DIAGNOSIS — I20.89 ATYPICAL ANGINA: Primary | ICD-10-CM

## 2025-05-05 DIAGNOSIS — E11.9 TYPE 2 DIABETES MELLITUS WITHOUT COMPLICATION, WITHOUT LONG-TERM CURRENT USE OF INSULIN (HCC): ICD-10-CM

## 2025-05-05 DIAGNOSIS — R94.31 ABNORMAL ECG: ICD-10-CM

## 2025-05-05 DIAGNOSIS — I42.0 CONGESTIVE CARDIOMYOPATHY (HCC): ICD-10-CM

## 2025-05-05 DIAGNOSIS — R01.1 SYSTOLIC MURMUR: ICD-10-CM

## 2025-05-05 DIAGNOSIS — I25.10 CORONARY ARTERY DISEASE INVOLVING NATIVE CORONARY ARTERY OF NATIVE HEART WITHOUT ANGINA PECTORIS: ICD-10-CM

## 2025-05-05 DIAGNOSIS — E78.00 HYPERCHOLESTEREMIA: ICD-10-CM

## 2025-05-05 PROCEDURE — 1123F ACP DISCUSS/DSCN MKR DOCD: CPT | Performed by: INTERNAL MEDICINE

## 2025-05-05 PROCEDURE — 93000 ELECTROCARDIOGRAM COMPLETE: CPT | Performed by: INTERNAL MEDICINE

## 2025-05-05 PROCEDURE — 99205 OFFICE O/P NEW HI 60 MIN: CPT | Performed by: INTERNAL MEDICINE

## 2025-05-05 RX ORDER — INSULIN LISPRO 100 [IU]/ML
1-6 INJECTION, SOLUTION INTRAVENOUS; SUBCUTANEOUS EVERY 6 HOURS
COMMUNITY
Start: 2024-10-07

## 2025-05-05 ASSESSMENT — PATIENT HEALTH QUESTIONNAIRE - PHQ9
SUM OF ALL RESPONSES TO PHQ QUESTIONS 1-9: 0
SUM OF ALL RESPONSES TO PHQ QUESTIONS 1-9: 0
1. LITTLE INTEREST OR PLEASURE IN DOING THINGS: NOT AT ALL
2. FEELING DOWN, DEPRESSED OR HOPELESS: NOT AT ALL
SUM OF ALL RESPONSES TO PHQ QUESTIONS 1-9: 0
SUM OF ALL RESPONSES TO PHQ QUESTIONS 1-9: 0

## 2025-05-05 ASSESSMENT — ENCOUNTER SYMPTOMS
SHORTNESS OF BREATH: 0
ALLERGIC/IMMUNOLOGIC NEGATIVE: 1
EYES NEGATIVE: 1
GASTROINTESTINAL NEGATIVE: 1

## 2025-05-05 NOTE — PROGRESS NOTES
1. \"Have you been to the ER, urgent care clinic since your last visit?  Hospitalized since your last visit?\" Reviewed by Dr. Ced Perez     2. \"Have you seen or consulted any other health care providers outside of the [facility] since your last visit?\" Reviewed by Dr. Ced Perez    
equal, round, reactive to light and accommodation. Extraocular movements are clear.  Neck: Supple, no jugular venous distention, no carotid bruits, trachea midline, no thyromegaly.  Heart: Normal S1 and S2, regular rate and rhythm, 1/6 systolic murmur at the left lower sternal border, S4 gallop noted.  Lungs: Clear to auscultation.  Abdomen: Soft, nondistended, nontender, normal bowel sounds, no masses, no organomegaly.  Extremities: No cyanosis, clubbing, or edema. Pulses are 1+ in the upper and lower extremities bilaterally.  Skin: Otherwise clear.      ASSESSMENT/PLAN:   Assessment & Plan  1. Coronary artery disease.  He presents with stable coronary artery disease, having experienced a myocardial infarction approximately 8 months prior. A catheterization procedure was performed, revealing moderate disease that did not necessitate stenting. He was informed that his current habits, including marijuana use and vaping, significantly increase his risk for recurrent myocardial infarction. It was strongly recommended that he consider modifying these habits. A smoking cessation program was offered as a potential resource. An EKG will be performed today, and an echocardiogram will be scheduled within the next month to monitor heart function.    2. Hypertension.  His hypertension is currently well-managed.    3. Hypercholesterolemia.  He is currently on atorvastatin therapy.    4. Diabetes mellitus.  His diabetes is managed through dietary control without the need for medication.    5. Stroke.  He experienced a stroke in 2023, resulting in right-sided weakness.    6. Marijuana use.  He was strongly advised to discontinue marijuana use due to its associated health risks.    7. Nicotine abuse.  He continues to vape, which carries risks similar to tobacco use for recurrent coronary disease. This was discussed with him in detail.    Follow-up:  A follow-up appointment will be scheduled for the beginning of the new

## 2025-05-12 ENCOUNTER — HOSPITAL ENCOUNTER (OUTPATIENT)
Facility: HOSPITAL | Age: 65
Setting detail: RECURRING SERIES
Discharge: HOME | End: 2025-05-15
Attending: STUDENT IN AN ORGANIZED HEALTH CARE EDUCATION/TRAINING PROGRAM
Payer: MEDICARE

## 2025-05-12 PROCEDURE — 97163 PT EVAL HIGH COMPLEX 45 MIN: CPT

## 2025-05-12 PROCEDURE — 97535 SELF CARE MNGMENT TRAINING: CPT

## 2025-05-12 NOTE — PROGRESS NOTES
[facility]  [address] [place] [address] Ph:[phone] Fx: [phone]  Plan of Care / Statement of Necessity for Physical Therapy Services     Patient Name: Armaan Fay : 1960   Medical   Diagnosis: Other sequelae of cerebral infarction [I69.398]  Hemiplegia, unspecified affecting left nondominant side (HCC) [G81.94]  Weakness [R53.1]  CVA (cerebral vascular accident) (HCC) [I63.9]  Left-sided weakness [R53.1] Treatment Diagnosis: R26.89  Abnormalities of gait and mobility and M62.81  GENERAL MUSCLE WEAKNESS      Onset Date: 2023 Payor :  Payor: MEDICARE / Plan: MEDICARE PART A AND B / Product Type: *No Product type* /    Referral Source: Alcira Barrios DO Start of Care (SOC): 2025   Prior Hospitalization: See medical history Provider #: [id number]   Prior Level of Function: independent ambulation   Comorbidities:  Depression, Gout, hemiplegia (R non dominant side), HLD, and Hx of CVA, radiculopathy (cervical), seizures, hx of dysphagia with PEG tube, STEMI (10/24), CAD, Hx of adjustment disorder, tobacco use, depression, arthritis, alcohol use, asthma      Assessment / key information:  Patient is a 65 y/o male who presents to clinic with daughter s/p CVA on 2023. Patient initially reports he is able to perform most of his ADL's, but his daughter reports he requires assistance for ~ 50% of ADL's. Patient's daughter states he complains of pain in his entire right side (from head to toe).  He denies any recent falls and denies any imbalance.     Objective:     Posture: [] Poor    [x] Fair    [] Good    Describe:        Gait: [] Normal    [] Abnormal    Device:    none  Describe: decreased SOREN, decreased speed    Strength (MMT): unable to assess due to poor participation/confusion     Behavior: [] Cooperative    [] Impulsive    [] Agitated    [] Perseverative    [x] Confused              Oriented x:     Cognition: [x] One Step

## 2025-05-12 NOTE — PROGRESS NOTES
PT DAILY TREATMENT NOTE/NEURO EVAL     Patient Name: Armaan Fay    Date: 2025    : 1960  Insurance: Payor: MEDICARE / Plan: MEDICARE PART A AND B / Product Type: *No Product type* /      Patient  verified yes     Visit #   Current / Total 1 -   Time   In / Out 9:30 10:02   Pain   In / Out 6/10 6/10   Subjective Functional Status/Changes: See below     Treatment Area: Other sequelae of cerebral infarction [I69.398]  Hemiplegia, unspecified affecting left nondominant side (HCC) [G81.94]  Weakness [R53.1]    SUBJECTIVE  Pain Level (0-10 scale): Best: 0/10 Worst: 10/10  []constant []intermittent []improving []worsening []no change since onset    Current symptoms/Complaints: Patient's daughter is present for eval and helps to provide the history. States he had a CVA in 2023. States he had HHPT. States this is his first outpatient PT.  He lives with family members in a 2 story condo (BR upstairs). Steps indoors have 1 handrail. He denies any recent falls. Daughter states he complains of pain in his entire right side (from head to toe).     PMH: Depression, Gout, hemiplegia (R non dominant side), HLD, and Hx of CVA, radiculopathy (cervical), seizures, hx of dysphagia with PEG tube, STEMI (10/24), CAD, Hx of adjustment disorder, tobacco use, depression, arthritis, alcohol use, asthma    OBJECTIVE/EXAMINATION  Domestic Life: see above  Activity/Recreational Limitations:   Mobility: [x] No assistive device [] RW [] Rollator [] Hemiwalker [] LBQC [] SBQC [] SPC [] Other:   Self Care: daughter reports he performs 50% of ADL's and requires assist for 50%, but patient reports he performs all ADL's independently      24 min [x]Eval     - untimed          Therapeutic Procedures:  Tx Min Billable or 1:1 Min (if diff from Tx Min) Procedure, Rationale, Specifics   8  97194 Self Care/Home Management (timed):  improve patient knowledge and understanding of home injury/symptom/pain management and home safety

## 2025-05-19 ENCOUNTER — HOSPITAL ENCOUNTER (OUTPATIENT)
Facility: HOSPITAL | Age: 65
Setting detail: RECURRING SERIES
Discharge: HOME | End: 2025-05-22
Attending: STUDENT IN AN ORGANIZED HEALTH CARE EDUCATION/TRAINING PROGRAM
Payer: MEDICARE

## 2025-05-19 PROCEDURE — 97112 NEUROMUSCULAR REEDUCATION: CPT

## 2025-05-19 PROCEDURE — 97110 THERAPEUTIC EXERCISES: CPT

## 2025-05-19 PROCEDURE — 97530 THERAPEUTIC ACTIVITIES: CPT

## 2025-05-19 NOTE — PROGRESS NOTES
6/12/2025  8:40 AM Veronica Muse, PT [facility]   6/12/2025  9:20 AM Janice Pinto, OT [facility]   6/12/2025 10:00 AM Italia Leone, SLP [facility]   6/16/2025  8:40 AM Italia Loene, SLP [facility]   6/16/2025  9:20 AM Janice Pinto, OT [facility]   6/16/2025 10:00 AM Dandy Mata, PT [facility]   6/25/2025  7:30 AM BS CARDIO NORF ECHO 2 [facility] BS AMB   1/27/2026  9:15 AM Ced Perez Sr., MD [facility] BS AMB

## 2025-05-19 NOTE — PROGRESS NOTES
PHYSICAL THERAPY - DAILY TREATMENT NOTE (updated )    Patient Name: Armaan Fay    Date: 2025    : 1960  Insurance: Payor: MEDICARE / Plan: MEDICARE PART A AND B / Product Type: *No Product type* /      Patient  verified yes     Visit #   Current / Total 2 8-24   Time   In / Out 9:25 9:50   Pain   In / Out 0/10 0/10   Subjective Functional Status/Changes: Patient denies pain today.      TREATMENT AREA =  Weakness following cerebrovascular accident (CVA)  Hemiplegia affecting left nondominant side, unspecified etiology, unspecified hemiplegia type (HCC)    Next PN due: 25; RC: 25  Auth due: SHEFALI    OBJECTIVE    Therapeutic Procedures:  Tx Min Billable or 1:1 Min (if diff from Tx Min) Procedure, Rationale, Specifics   15  58103 Therapeutic Activity (timed):  use of dynamic activities replicating functional movements to increase ROM, strength, coordination, balance, and proprioception in order to improve patient's ability to progress to PLOF and address remaining functional goals.  (see flow sheet as applicable)     Details if applicable:  NuStep, dynamic gait, step ups   10  87627 Neuromuscular Re-Education (timed):  improve balance, coordination, kinesthetic sense, posture, core stability and proprioception to improve patient's ability to develop conscious control of individual muscles and awareness of position of extremities in order to progress to PLOF and address remaining functional goals. (see flow sheet as applicable)     Details if applicable:  EO/EC balance exercises   25  MC BC Totals Reminder: bill using total billable min of TIMED therapeutic procedures (example: do not include dry needle or estim unattended, both untimed codes, in totals to left)  8-22 min = 1 unit; 23-37 min = 2 units; 38-52 min = 3 units; 53-67 min = 4 units; 68-82 min = 5 units   Total Total     [x]  Patient Education billed concurrently with other procedures   [x] Review HEP    [] Progressed/Changed

## 2025-05-22 ENCOUNTER — TELEPHONE (OUTPATIENT)
Facility: HOSPITAL | Age: 65
End: 2025-05-22

## 2025-05-22 ENCOUNTER — HOSPITAL ENCOUNTER (OUTPATIENT)
Facility: HOSPITAL | Age: 65
Setting detail: RECURRING SERIES
End: 2025-05-22
Attending: STUDENT IN AN ORGANIZED HEALTH CARE EDUCATION/TRAINING PROGRAM
Payer: MEDICARE

## 2025-05-22 NOTE — TELEPHONE ENCOUNTER
NS- Spoke with patient daughter. Daughter mistaken on what day it was, unable to make todays appointments. Confirmed next appointment.

## 2025-05-29 ENCOUNTER — HOSPITAL ENCOUNTER (OUTPATIENT)
Facility: HOSPITAL | Age: 65
Setting detail: RECURRING SERIES
End: 2025-05-29
Attending: STUDENT IN AN ORGANIZED HEALTH CARE EDUCATION/TRAINING PROGRAM
Payer: MEDICARE

## 2025-05-29 ENCOUNTER — TELEPHONE (OUTPATIENT)
Facility: HOSPITAL | Age: 65
End: 2025-05-29

## 2025-05-29 NOTE — PROGRESS NOTES
OCCUPATIONAL THERAPY - DAILY TREATMENT NOTE    Patient Name: Armaan Fay    Date: 2025    : 1960  Insurance: Payor: MEDICARE / Plan: MEDICARE PART A AND B / Product Type: *No Product type* /        Ins Auth:  SHEFALI   Next PN Due By:  24                        Patient  verified Yes     Visit #   Current / Total 3 12   Time   In / Out     Total Treatment Time    Pain   In / Out     Subjective Functional Status/Changes:      TREATMENT AREA =  Weakness following cerebrovascular accident (CVA) [I69.398, R53.1]  Hemiplegia affecting left nondominant side, unspecified etiology, unspecified hemiplegia type (HCC) [G81.94]    OBJECTIVE    Therapeutic Procedures:  Tx Min Billable or 1:1 Min (if diff from Tx Min) Procedure, Rationale, Specifics   30  58706 Therapeutic Exercise (timed):  increase ROM, strength, coordination, and proprioception to reach (see flow sheet as applicable)    Soft Theraputty: manipualted with Righ thand to reveal/remove 1/4 in pegs    Shoulder Arc: Right UE. Right to left each arc     Ball Rolls: Right UE     10  93780 Therapeutic Activity (timed):  use of dynamic activities replicating functional movements to improve fine motor (see flow sheet as applicable)    Coins- picked up from table with RUE, tasked with reaching various planes to place coin into bank                               TOTAL 1:1 TREATMENT TIME:  (Total Time - Paraffin/Vaso/Fluido)        40           Billed concurrently with other treatments Patient Education:  Reviewed HEP     Objective Information/Functional Measures/Assessment    - combative through out session   - constant redirection to task/following instructions   - able to use Right hand with constant cueing   - intentional use of Left UE to get reaction from therapist   -  self limiting with all attempts to get patient to include Right UE into task           Assessment/Plan:               []  See Progress Note/Re certification     Patient will continue

## 2025-05-29 NOTE — TELEPHONE ENCOUNTER
called about missed appointment. left message that this is his 2nd missed appointment in a row and will be D/C from PT and OT at this time. reminded of 6/2 speech therapy appointment

## 2025-06-02 ENCOUNTER — TELEPHONE (OUTPATIENT)
Facility: HOSPITAL | Age: 65
End: 2025-06-02

## 2025-07-03 DIAGNOSIS — F33.1 MODERATE EPISODE OF RECURRENT MAJOR DEPRESSIVE DISORDER (HCC): ICD-10-CM

## 2025-07-03 RX ORDER — OLANZAPINE 2.5 MG/1
2.5 TABLET, FILM COATED ORAL NIGHTLY
Qty: 90 TABLET | Refills: 1 | Status: SHIPPED | OUTPATIENT
Start: 2025-07-03

## 2025-07-03 NOTE — TELEPHONE ENCOUNTER
Mr. Fay is requesting refills of:    Requested Prescriptions     Pending Prescriptions Disp Refills    OLANZapine (ZYPREXA) 2.5 MG tablet [Pharmacy Med Name: OLANZAPINE 2.5 MG TABLET] 90 tablet 0     Sig: TAKE 1 TABLET BY MOUTH EVERY DAY AT NIGHT         Phone: [phone] Fax: [phone]    [facility]pharmacy #[id number] - [place], [place] - [address] [facility] -  [phone] - F [phone]  [address] [place] [address]  Phone: [phone] Fax: [phone]  .     LAST OFFICE VISIT:  3/12/2025     UPCOMING APPOINTMENT(S):  Future Appointments   Date Time Provider Department Center   1/27/2026  9:15 AM Ced Perez Sr., MD [facility] AMB         Provided notified

## 2025-07-06 DIAGNOSIS — I21.3 ST ELEVATION MYOCARDIAL INFARCTION (STEMI), UNSPECIFIED ARTERY (HCC): ICD-10-CM

## 2025-07-07 RX ORDER — METOPROLOL TARTRATE 25 MG/1
25 TABLET, FILM COATED ORAL DAILY
Qty: 90 TABLET | Refills: 1 | Status: SHIPPED | OUTPATIENT
Start: 2025-07-07

## 2025-07-07 RX ORDER — FUROSEMIDE 20 MG/1
20 TABLET ORAL DAILY
Qty: 90 TABLET | Refills: 1 | Status: SHIPPED | OUTPATIENT
Start: 2025-07-07

## 2025-07-07 RX ORDER — ALLOPURINOL 100 MG/1
100 TABLET ORAL NIGHTLY
Qty: 90 TABLET | Refills: 1 | Status: SHIPPED | OUTPATIENT
Start: 2025-07-07

## 2025-07-07 NOTE — TELEPHONE ENCOUNTER
Mr. Fay is requesting refills of:    Requested Prescriptions     Pending Prescriptions Disp Refills    metoprolol tartrate (LOPRESSOR) 25 MG tablet [Pharmacy Med Name: METOPROLOL TARTRATE 25 MG TAB] 90 tablet 0     Sig: TAKE 1 TABLET BY MOUTH EVERY DAY    allopurinol (ZYLOPRIM) 100 MG tablet [Pharmacy Med Name: ALLOPURINOL 100 MG TABLET] 90 tablet 0     Sig: TAKE 1 TABLET BY MOUTH EVERY DAY AT NIGHT       [facility]/pharmacy #[id number] - [place], [place] - [address] [facility] -  [phone] - F [phone]  [address] [place] [address]  Phone: [phone] Fax: [phone]  .     LAST OFFICE VISIT:  3/12/2025     UPCOMING APPOINTMENT(S):  Future Appointments   Date Time Provider Department Center   1/27/2026  9:15 AM Ced Perez Sr., MD [facility] BS AMB         Provided notified     Ambulatory

## 2025-07-07 NOTE — TELEPHONE ENCOUNTER
Mr. Fay is requesting refills of:    Requested Prescriptions     Pending Prescriptions Disp Refills    furosemide (LASIX) 20 MG tablet [Pharmacy Med Name: FUROSEMIDE 20 MG TABLET] 90 tablet 1     Sig: TAKE 1 TABLET BY MOUTH EVERY DAY       to be sent to   [facility] - [place], [place] - [address] [facility] [address] - P [phone] - F [phone]  [address] [place] [address]  Phone: [phone] Fax: [phone]    [address] - [address] [facility] - P [phone] - F [phone]  [address] [facility] [address]  Phone: [phone] Fax: [phone]    [facility]/pharmacy #[id number] - [place], [place] - [address] [facility] - P [phone] - F [phone]  [address] [facility] [address]  Phone: [phone] Fax: [phone]    [facility]/pharmacy #[id number] [place], [place] - [address] [facility] - P [phone] - F [phone]  [address] [facility] [address]  Phone: [phone] Fax: [phone]  .     LAST OFFICE VISIT:  3/12/2025     UPCOMING APPOINTMENT(S):  Future Appointments   Date Time Provider Department Center   1/27/2026  9:15 AM Ced Perez Sr., MD [facility] BS AMB         Provided notified

## 2025-07-09 DIAGNOSIS — E78.2 MIXED HYPERLIPIDEMIA: ICD-10-CM

## 2025-07-09 RX ORDER — ATORVASTATIN CALCIUM 40 MG/1
40 TABLET, FILM COATED ORAL
Qty: 90 TABLET | Refills: 1 | Status: SHIPPED | OUTPATIENT
Start: 2025-07-09

## 2025-07-09 NOTE — TELEPHONE ENCOUNTER
Mr. Fay is requesting refills of:    Requested Prescriptions     Pending Prescriptions Disp Refills    atorvastatin (LIPITOR) 40 MG tablet 90 tablet 0     Sig: Take 1 tablet by mouth nightly       [facility]/pharmacy #[id number] - [place], [place] - [address] [facility] - P [phone] - F [phone]  [address] [facility] [address]  Phone: [phone] Fax: [phone]  .     LAST OFFICE VISIT:  3/12/2025     UPCOMING APPOINTMENT(S):  Future Appointments   Date Time Provider Department Center   1/27/2026  9:15 AM Ced Perez Sr., MD [facility] BS AMB         Provided notified